# Patient Record
Sex: FEMALE | Race: WHITE | Employment: UNEMPLOYED | URBAN - METROPOLITAN AREA
[De-identification: names, ages, dates, MRNs, and addresses within clinical notes are randomized per-mention and may not be internally consistent; named-entity substitution may affect disease eponyms.]

---

## 2018-09-17 ENCOUNTER — HOSPITAL ENCOUNTER (OUTPATIENT)
Dept: ULTRASOUND IMAGING | Age: 33
Discharge: HOME | End: 2018-09-17
Attending: OBSTETRICS & GYNECOLOGY
Payer: COMMERCIAL

## 2018-09-17 DIAGNOSIS — R20.2 NUMBNESS AND TINGLING OF LEFT LEG: ICD-10-CM

## 2018-09-17 DIAGNOSIS — R20.0 NUMBNESS AND TINGLING OF LEFT LEG: ICD-10-CM

## 2018-09-17 DIAGNOSIS — M79.605 PAIN OF LEFT LOWER EXTREMITY: ICD-10-CM

## 2018-09-17 PROCEDURE — 93971 EXTREMITY STUDY: CPT

## 2018-09-29 PROBLEM — Z34.80 PRENATAL CARE OF MULTIGRAVIDA, ANTEPARTUM: Status: ACTIVE | Noted: 2018-09-29

## 2019-01-04 ENCOUNTER — HOSPITAL ENCOUNTER (OUTPATIENT)
Age: 34
Discharge: HOME | End: 2019-01-04
Attending: OBSTETRICS & GYNECOLOGY | Admitting: OBSTETRICS & GYNECOLOGY
Payer: COMMERCIAL

## 2019-01-04 VITALS
TEMPERATURE: 98.7 F | DIASTOLIC BLOOD PRESSURE: 74 MMHG | BODY MASS INDEX: 21.74 KG/M2 | RESPIRATION RATE: 16 BRPM | SYSTOLIC BLOOD PRESSURE: 107 MMHG | HEIGHT: 68 IN | HEART RATE: 94 BPM

## 2019-01-04 PROBLEM — R10.9 ABDOMINAL PAIN DURING PREGNANCY IN SECOND TRIMESTER: Status: ACTIVE | Noted: 2019-01-04

## 2019-01-04 PROBLEM — O26.892 ABDOMINAL PAIN DURING PREGNANCY IN SECOND TRIMESTER: Status: ACTIVE | Noted: 2019-01-04

## 2019-01-04 PROCEDURE — 76817 TRANSVAGINAL US OBSTETRIC: CPT

## 2019-01-04 PROCEDURE — 59025 FETAL NON-STRESS TEST: CPT

## 2019-01-04 PROCEDURE — 99283 EMERGENCY DEPT VISIT LOW MDM: CPT

## 2019-01-04 NOTE — PROGRESS NOTES
Pt here in triage with complaints of possible contractions. Pt states she was having severe pain last night; pt rates pain 7/10 and stated it started in lower belly and wrapped around to lower back. Pt reports +FM; no LOF and no vaginal bleeding.  Will notify MD of pt arrival.

## 2019-01-04 NOTE — ED PROVIDER NOTES
Chief Complaint: pelvic pain 35 y.o. female   at 22w6d 
weeks gestation who is seen for moderate abdominal pain. Pt reports episode yesterday of crampy pelvic pain that radiated to back. It resolved but returned stronger last night (pain a 7/10.) that also subsided spontaneously, but returned today- milder. Pt is concerned that she could be having  contractions. She has had 2 term deliveries but had  contractions with one of her previous pregnancies. No vag bleeding or discharge. No hx uti's. Some constipation with this pregnancy. HISTORY: 
 
Social History Substance and Sexual Activity Sexual Activity Yes  Partners: Male  Birth control/protection: None Comment: pregnant Patient's last menstrual period was 2018 (approximate). Social History Socioeconomic History  Marital status:  Spouse name: Janelle Villanueva Number of children: 1  Years of education: 15  
 Highest education level: Not on file Social Needs  Financial resource strain: Not on file  Food insecurity - worry: Not on file  Food insecurity - inability: Not on file  Transportation needs - medical: Not on file  Transportation needs - non-medical: Not on file Occupational History  Not on file Tobacco Use  Smoking status: Never Smoker  Smokeless tobacco: Never Used Substance and Sexual Activity  Alcohol use: No  
 Drug use: No  
 Sexual activity: Yes  
  Partners: Male Birth control/protection: None Comment: pregnant Other Topics Concern [address] Service Not Asked  Blood Transfusions Not Asked  Caffeine Concern Not Asked  Occupational Exposure Not Asked [employer] Hazards Not Asked  Sleep Concern Not Asked  Stress Concern Not Asked  Weight Concern Not Asked  Special Diet Not Asked  Back Care Not Asked  Exercise Not Asked  Bike Helmet Not Asked  Seat Belt Not Asked  Self-Exams Not Asked Social History Narrative  Not on file Past Surgical History:  
Procedure Laterality Date  HX OTHER SURGICAL    
 bone graft on upper jaw   HX TONSILLECTOMY Past Medical History:  
Diagnosis Date  Asthma   
 History of chicken pox ROS: 
A 12 point review of symptoms negative except for chief complaint as described above. PHYSICAL EXAM: 
Blood pressure 107/74, pulse 94, temperature 98.7 °F (37.1 °C), resp. rate 16, height 5' 8\" (1.727 m), last menstrual period 2018, currently breastfeeding. Constitutional: The patient appears well, alert, oriented x 3. Cardiovascular: Heart RRR, no murmurs. Respiratory: Lungs clear, no respiratory distress GI: Abdomen soft, nontender, no guarding No fundal tenderness Musculoskeletal: no cva tenderness Upper ext: no edema, reflexes +2 Lower ext: no edema, neg juan's, reflexes +2 Skin: no rashes or lesions Psychiatric:Mood/ Affect: appropriate Genitourinary: SVE: cl/th FHT: + appropriate gest age TOCO:no contractions on monitor 
transvag ultrasound- cervical length 4.5 cm, no funneling 
ua neg except trace leukocytes I personally reviewed pt's medical record including relevant labs and ultrasounds Assessment/Plan: 
36 yo  at 22w6d with pelvic pain that is intermittent and crampy No evidence  labor; possible pain from GI, encouraged hydration, magnesium Keep f/u as scheduled

## 2019-01-04 NOTE — DISCHARGE INSTRUCTIONS
Patient Education        Pregnancy Precautions: Care Instructions  Your Care Instructions    There is no sure way to prevent labor before your due date ( labor) or to prevent most other pregnancy problems. But there are things you can do to increase your chances of a healthy pregnancy. Go to your appointments, follow your doctor's advice, and take good care of yourself. Eat well, and exercise (if your doctor agrees). And make sure to drink plenty of water. Follow-up care is a key part of your treatment and safety. Be sure to make and go to all appointments, and call your doctor if you are having problems. It's also a good idea to know your test results and keep a list of the medicines you take. How can you care for yourself at home? · Make sure you go to your prenatal appointments. At each visit, your doctor will check your blood pressure. Your doctor will also check to see if you have protein in your urine. High blood pressure and protein in urine are signs of preeclampsia. This condition can be dangerous for you and your baby. · Drink plenty of fluids, enough so that your urine is light yellow or clear like water. Dehydration can cause contractions. If you have kidney, heart, or liver disease and have to limit fluids, talk with your doctor before you increase the amount of fluids you drink. · Tell your doctor right away if you notice any symptoms of an infection, such as:  ? Burning when you urinate. ? A foul-smelling discharge from your vagina. ? Vaginal itching. ? Unexplained fever. ? Unusual pain or soreness in your uterus or lower belly. · Eat a balanced diet. Include plenty of foods that are high in calcium and iron. ? Foods high in calcium include milk, cheese, yogurt, almonds, and broccoli. ? Foods high in iron include red meat, shellfish, poultry, eggs, beans, raisins, whole-grain bread, and leafy green vegetables. · Do not smoke.  If you need help quitting, talk to your doctor about stop-smoking programs and medicines. These can increase your chances of quitting for good. · Do not drink alcohol or use illegal drugs. · Follow your doctor's directions about activity. Your doctor will let you know how much, if any, exercise you can do. · Ask your doctor if you can have sex. If you are at risk for early labor, your doctor may ask you to not have sex. · Take care to prevent falls. During pregnancy, your joints are loose, and your balance is off. Sports such as bicycling, skiing, or in-line skating can increase your risk of falling. And don't ride horses or motorcycles, dive, water ski, scuba dive, or parachute jump while you are pregnant. · Avoid getting very hot. Do not use saunas or hot tubs. Avoid staying out in the sun in hot weather for long periods. Take acetaminophen (Tylenol) to lower a high fever. · Do not take any over-the-counter or herbal medicines or supplements without talking to your doctor or pharmacist first.  When should you call for help? Call 911 anytime you think you may need emergency care. For example, call if:    · You passed out (lost consciousness).     · You have severe vaginal bleeding.     · You have severe pain in your belly or pelvis.     · You have had fluid gushing or leaking from your vagina and you know or think the umbilical cord is bulging into your vagina. If this happens, immediately get down on your knees so your rear end (buttocks) is higher than your head. This will decrease the pressure on the cord until help arrives.   [facility] your doctor now or seek immediate medical care if:    · You have signs of preeclampsia, such as:  ? Sudden swelling of your face, hands, or feet. ? New vision problems (such as dimness or blurring). ? A severe headache.     · You have any vaginal bleeding.     · You have belly pain or cramping.     · You have a fever.     · You have had regular contractions (with or without pain) for an hour.  This means that you have 8 or more within 1 hour or 4 or more in 20 minutes after you change your position and drink fluids.     · You have a sudden release of fluid from your vagina.     · You have low back pain or pelvic pressure that does not go away.     · You notice that your baby has stopped moving or is moving much less than normal.    Watch closely for changes in your health, and be sure to contact your doctor if you have any problems. Where can you learn more? Go to http://genaro-maria.info/. Enter [id number] in the search box to learn more about \"Pregnancy Precautions: Care Instructions. \"  Current as of: November 21, 2017  Content Version: 11.8  © 9163-4021 [employer], [employer]. Care instructions adapted under license by [employer] (which disclaims liability or warranty for this information). If you have questions about a medical condition or this instruction, always ask your healthcare professional. [address] any warranty or liability for your use of this information.

## 2019-01-23 ENCOUNTER — HOSPITAL ENCOUNTER (OUTPATIENT)
Dept: PHYSICAL THERAPY | Age: 34
Discharge: HOME | End: 2019-01-23
Payer: COMMERCIAL

## 2019-01-23 PROCEDURE — 97110 THERAPEUTIC EXERCISES: CPT

## 2019-01-23 PROCEDURE — 97162 PT EVAL MOD COMPLEX 30 MIN: CPT

## 2019-01-23 NOTE — THERAPY EVALUATION
Abimael Aguayo  : 1985  Primary: Poli Reyesgoran Kina  Secondary:  [address]  at [place] [address] [facility], [address], [address] [facility]  [phone]   [phone]           OUTPATIENT PHYSICAL THERAPY:Initial Assessment 2019   ICD-10: Treatment Diagnosis: Low back pain (M54.5); Pain in right hip (M25.551); Pain in left hip (M25.552)  Precautions/Allergies:   Sulfa (sulfonamide antibiotics)   MD Orders: Evaluate and Treat MEDICAL/REFERRING DIAGNOSIS:  Pregnancy related hip pain, antepartum [O26.899, M25.559]   DATE OF ONSET: 2019  REFERRING PHYSICIAN: Sy Donnelly MD  RETURN PHYSICIAN APPOINTMENT: 19     INITIAL ASSESSMENT:  Ms. Juliet Amador presents with decreased lumbar ROM, LE strength/flexibility and increased pain leading to decreased functional status. Pt would benefit from skilled physical therapy services to address the above deficits and help patient return to prior level of function. PROBLEM LIST (Impacting functional limitations):  1. Decreased Strength  2. Decreased ADL/Functional Activities  3. Increased Pain  4. Decreased Flexibility/Joint Mobility  5. Decreased [place] with Home Exercise Program INTERVENTIONS PLANNED: (Treatment may consist of any combination of the following)  1. Cold  2. Cryotherapy  3. Electrical Stimulation  4. Heat  5. Home Exercise Program (HEP)  6. Manual Therapy  7. Range of Motion (ROM)  8. Therapeutic Exercise/Strengthening   TREATMENT PLAN:  Effective Dates: 2019 TO 3/24/2019 (60 days). Frequency/Duration: 2 times a week for 60 Day(s)  GOALS: (Goals have been discussed and agreed upon with patient.)  Short-Term Functional Goals: Time Frame: 4 weeks  1. Pt will increase SLR 50 degrees bilaterally to decrease pain and assist with sleeping  2. Pt will increase strength bilateral hips 4+/5 to decrease pain and assist with sleeping  3.  Pt will be independent with HEP  Discharge Goals: Time Frame: 8 weeks  1. Pt will increase SLR 60 degrees bilaterally to decrease pain and assist with sleeping  2. Pt will increase strength bilateral hips 5/5 to decrease pain and assist with sleeping  3. Pt will sleep 8 hours without awakening due to hip pain  Rehabilitation Potential For Stated Goals: Good  Regarding Larissa Gaines's therapy, I certify that the treatment plan above will be carried out by a therapist or under their direction. Thank you for this referral,  Padmini Verduzco, PT     Referring Physician Signature: Netta St MD              Date                    The information in this section was collected on 01-23-19 (except where otherwise noted). HISTORY:   History of Present Injury/Illness (Reason for Referral):  Pt reports insidious onset bilateral hip pain of a few weeks duration. She has 2 children and is 26 weeks pregnant with her 3rd child. Pt states her hips felt loose after her second pregnancy and she did some strengthening and the pain improved until she got pregnant again. Pt states her pain is in the lateral hips \"in the joint. \"  Pt sleeps on her left side and occasionally on her right. She rates her current pain 0/10 sitting at rest, increasing to 8/10 at times. Pt states the pain is worst at night when sleeping. She sleeps max of 6 hours a night. She states she wakes up constantly throughout the night due to her pain. Pt is taking Tylenol prn for her hip pain. She reports some occasional pain in her low back. She states when she gets the pain that it sometimes radiates into her thighs. She denies any LE numbness or tingling. Pt is a stay at home mom. Pt reports popping in her hips at times. She states she feels like her hips are going to fall out of joint. Pt would like to return to her gym to workout once her hips get a little stronger.   Past Medical History/Comorbidities:   Ms. Travis Leigh  has a past medical history of Asthma and History of chicken pox. She also has no past medical history of Abnormal Papanicolaou smear of cervix, Anemia, Chlamydia, Complication of anesthesia, Diabetes ([facility] Disease of blood and blood forming organ, DVT (deep venous thrombosis) ([facility] Eclampsia, Ectopic pregnancy, Essential hypertension, Genital herpes, Gestational diabetes, Gestational hypertension, Gonorrhea, Heart abnormality, Herpes gestationis, Herpes simplex virus (HSV) infection, Human immunodeficiency virus (HIV) disease ([facility] Hypertension, Hyperthyroidism, Hypothyroidism, Infertility, female, Kidney disease, Liver disease, Molar pregnancy, Nicotine vapor product user, Non-nicotine vapor product user, Phlebitis and thrombophlebitis of unspecified site, Pituitary disorder ([facility] Polycystic disease, ovaries, Postpartum depression, Preeclampsia,  delivery, Psychiatric problem, Rh negative state in antepartum period, Rhesus isoimmunization affecting pregnancy, Sickle cell trait syndrome ([facility] Sickle-cell disease, unspecified, Syphilis, Systemic lupus erythematosus ([facility] Thyroid activity decreased, Trauma, Unspecified breast disorder, Unspecified epilepsy without mention of intractable epilepsy, or  (vaginal birth after ). Ms. Digna Rand  has a past surgical history that includes hx tonsillectomy and hx other surgical.  Social History/Living Environment:     Pt lives with spouse and 2 children in a one story house with a few steps to enter  Prior Level of Function/Work/Activity:  Pt is a stay at home mom  Dominant Side:         RIGHT  Other Clinical Tests:          None  Personal Factors:          Sex:  female        Age:  35 y.o.         Profession:  Pt is a stay at home mom     Ambulatory/Rehab Services H2 Model Falls Risk Assessment    Risk Factors:       No Risk Factors Identified Ability to Rise from Chair:       (0)  Ability to rise in a single movement    Falls Prevention Plan:       No modifications necessary   Total: (5 or greater = High Risk): 0    ©2010 [facility] [address] [place] [address] States Patent #[id number]. Federal Law prohibits the replication, distribution or use without written permission from [facility] [employer]     Current Medications:       Current Outpatient Medications:     Cetirizine (ZYRTEC) 10 mg cap, Take  by mouth., Disp: , Rfl:     PROGESTERONE MICRONIZED PO, Take 25 mg by mouth., Disp: , Rfl:     PNV53-iron-FA-docusate Ca-dha (NEXA PLUS) 29 mg iron-1.25 mg-55 mg cap, Take  by mouth., Disp: , Rfl:    Date Last Reviewed:  01-23-19   Number of Personal Factors/Comorbidities that affect the Plan of Care: 1-2: MODERATE COMPLEXITY   EXAMINATION:   Observation/Orthostatic Postural Assessment:          Increased lumbar lordosis  Palpation:          No tenderness to palpation in low back or LE's  ROM:    Lumbar flexion = 50 degrees  Lumbar extension = 20 degrees  Lumbar side bending R = 30 degrees  Lumbar side bending L = 30 degrees    Strength:    R hip flexion = 4/5  R hip abduction = 4/5  R hip adduction = 4+/5  R knee extension = 4+/5  R knee flexion = 4+/5  R ankle dorsiflexion = 5/5  R ankle plantarflexion = 5/5    L hip flexion = 4/5  L hip abduction = 4/5  L hip adduction = 4+/5  L knee extension = 4+/5  L knee flexion = 4+/5  L ankle dorsiflexion = 5/5  L ankle plantarflexion = 5/5    Special Tests:          SLR 40 degrees with marked hamstring tightness noted bilaterally  Neurological Screen:        Myotomes:  Weakness throughout bilateral hips        Dermatomes:  Sensation intact to light touch bilateral LE's        Reflexes:  DTR's 2+ to bilateral achilles and patellar  Functional Mobility:         Gait/Ambulation:  No deficits noted        Transfers:  Pt able to transition sit to supine and supine to sit independently    Body Structures Involved:  1. Muscles Body Functions Affected:  1. Sensory/Pain  2. Neuromusculoskeletal Activities and Participation Affected:  1. Mobility  2.  Domestic Life   Number of elements (examined above) that affect the Plan of Care: 3: MODERATE COMPLEXITY   CLINICAL PRESENTATION:   Presentation: Evolving clinical presentation with changing clinical characteristics: MODERATE COMPLEXITY   CLINICAL DECISION MAKING:   Outcome Measure: Tool Used: Lower Extremity Functional Scale (LEFS)  Score:  Initial: 60/80 Most Recent: X/80 (Date: -- )   Interpretation of Score: 20 questions each scored on a 5 point scale with 0 representing \"extreme difficulty or unable to perform\" and 4 representing \"no difficulty\". The lower the score, the greater the functional disability. 80/80 represents no disability. Minimal detectable change is 9 points. Medical Necessity:   · Patient is expected to demonstrate progress in strength, range of motion and functional technique to increase independence with sleeping. · Patient demonstrates good rehab potential due to higher previous functional level. Reason for Services/Other Comments:  · Patient continues to require skilled intervention due to decreased lumbar ROM, LE strength/flexibility and increased pain leading to decreased functional status. Use of outcome tool(s) and clinical judgement create a POC that gives a: Questionable prediction of patient's progress: MODERATE COMPLEXITY            TREATMENT:   (In addition to Assessment/Re-Assessment sessions the following treatments were rendered)  Pre-treatment Symptoms/Complaints:  Bilateral hip pain  Pain: Initial:     0/10 sitting at rest Post Session:  0/10 sitting at rest     THERAPEUTIC EXERCISE: (15 minutes):  Exercises per grid below to improve mobility and strength. Required minimal verbal cues to promote proper body alignment and promote proper body posture. Progressed resistance and repetitions as indicated.    Date:  01-23-19 Date:   Date:     Activity/Exercise Parameters Parameters Parameters   Active Hamstring Stretch 3 reps  20 sec holds     Hip Abduction with T-band Green  20 reps     Hip Adduction with Ball 20 reps  5 sec holds     Pelvic Tilts 15 reps  5 sec holds     SLR Flexion 10 reps  5 sec holds                     [facility] Portal  Treatment/Session Assessment:    · Response to Treatment:  Pt tolerated all treatments well with no c/o. Pt may come just one more visit to progress HEP, then transition to gym if she is improving. · Compliance with Program/Exercises: Will assess as treatment progresses. · Recommendations/Intent for next treatment session: \"Next visit will focus on advancements to more challenging activities\".   Total Treatment Duration:  15 minutes  PT Patient Time In/Time Out  Time In: 7704  Time Out: [address], PT    Future Appointments   Date Time Provider Andrea Trevino   1/30/2019  9:45 AM Elton Nickerson PT Located within [facility]   2/6/2019  8:30 AM Janae Stanley MD [place] [facility]   2/6/2019  8:40 AM [address]

## 2019-01-30 ENCOUNTER — HOSPITAL ENCOUNTER (OUTPATIENT)
Dept: PHYSICAL THERAPY | Age: 34
Discharge: HOME | End: 2019-01-30
Payer: COMMERCIAL

## 2019-01-30 PROCEDURE — 97110 THERAPEUTIC EXERCISES: CPT

## 2019-01-30 NOTE — PROGRESS NOTES
Edwina Plaster  : 1985  Primary: Shae Whyte Kina  Secondary:  [address] at [address] [facility] [address], [address] [place] [address], [address] [facility]  [phone]   [phone]           OUTPATIENT PHYSICAL THERAPY:Daily Note and Discharge Summary 2019   ICD-10: Treatment Diagnosis: Low back pain (M54.5); Pain in right hip (M25.551); Pain in left hip (M25.552)  Precautions/Allergies:   Sulfa (sulfonamide antibiotics)   MD Orders: Evaluate and Treat MEDICAL/REFERRING DIAGNOSIS:  Pregnancy related hip pain, antepartum [O26.899, M25.559]   DATE OF ONSET: 2019  REFERRING PHYSICIAN: Mari Tran MD  RETURN PHYSICIAN APPOINTMENT: 19     INITIAL ASSESSMENT:  Ms. Brittany Mckeon presents with decreased lumbar ROM, LE strength/flexibility and increased pain leading to decreased functional status. Pt would benefit from skilled physical therapy services to address the above deficits and help patient return to prior level of function. PROBLEM LIST (Impacting functional limitations):  1. Decreased Strength  2. Decreased ADL/Functional Activities  3. Increased Pain  4. Decreased Flexibility/Joint Mobility  5. Decreased [place] with Home Exercise Program INTERVENTIONS PLANNED: (Treatment may consist of any combination of the following)  1. Cold  2. Cryotherapy  3. Electrical Stimulation  4. Heat  5. Home Exercise Program (HEP)  6. Manual Therapy  7. Range of Motion (ROM)  8. Therapeutic Exercise/Strengthening   TREATMENT PLAN:  Effective Dates: 2019 TO 3/24/2019 (60 days). Frequency/Duration: 2 times a week for 60 Day(s)  GOALS: (Goals have been discussed and agreed upon with patient.)  Short-Term Functional Goals: Time Frame: 4 weeks  1. Pt will increase SLR 50 degrees bilaterally to decrease pain and assist with sleeping  2. Pt will increase strength bilateral hips 4+/5 to decrease pain and assist with sleeping  3.  Pt will be independent with HEP  Discharge Goals: Time Frame: 8 weeks  1. Pt will increase SLR 60 degrees bilaterally to decrease pain and assist with sleeping  2. Pt will increase strength bilateral hips 5/5 to decrease pain and assist with sleeping  3. Pt will sleep 8 hours without awakening due to hip pain  Rehabilitation Potential For Stated Goals: Good  Regarding Neida Gaines's therapy, I certify that the treatment plan above will be carried out by a therapist or under their direction. Thank you for this referral,  Marquita Perry PT     Referring Physician Signature: Ivy Coleman MD              Date                    The information in this section was collected on 01-23-19 (except where otherwise noted). HISTORY:   History of Present Injury/Illness (Reason for Referral):  Pt reports insidious onset bilateral hip pain of a few weeks duration. She has 2 children and is 26 weeks pregnant with her 3rd child. Pt states her hips felt loose after her second pregnancy and she did some strengthening and the pain improved until she got pregnant again. Pt states her pain is in the lateral hips \"in the joint. \"  Pt sleeps on her left side and occasionally on her right. She rates her current pain 0/10 sitting at rest, increasing to 8/10 at times. Pt states the pain is worst at night when sleeping. She sleeps max of 6 hours a night. She states she wakes up constantly throughout the night due to her pain. Pt is taking Tylenol prn for her hip pain. She reports some occasional pain in her low back. She states when she gets the pain that it sometimes radiates into her thighs. She denies any LE numbness or tingling. Pt is a stay at home mom. Pt reports popping in her hips at times. She states she feels like her hips are going to fall out of joint. Pt would like to return to her gym to workout once her hips get a little stronger.   Past Medical History/Comorbidities:   Ms. Kel Benjamin  has a past medical history of Asthma and History of chicken pox. She also has no past medical history of Abnormal Papanicolaou smear of cervix, Anemia, Chlamydia, Complication of anesthesia, Diabetes ([facility] [address] Disease of blood and blood forming organ, DVT (deep venous thrombosis) ([facility] [address] Eclampsia, Ectopic pregnancy, Essential hypertension, Genital herpes, Gestational diabetes, Gestational hypertension, Gonorrhea, Heart abnormality, Herpes gestationis, Herpes simplex virus (HSV) infection, Human immunodeficiency virus (HIV) disease ([facility] [address] Hypertension, Hyperthyroidism, Hypothyroidism, Infertility, female, Kidney disease, Liver disease, Molar pregnancy, Nicotine vapor product user, Non-nicotine vapor product user, Phlebitis and thrombophlebitis of unspecified site, Pituitary disorder ([facility] [address] Polycystic disease, ovaries, Postpartum depression, Preeclampsia,  delivery, Psychiatric problem, Rh negative state in antepartum period, Rhesus isoimmunization affecting pregnancy, Sickle cell trait syndrome ([facility] [address] Sickle-cell disease, unspecified, Syphilis, Systemic lupus erythematosus ([facility] [address] Thyroid activity decreased, Trauma, Unspecified breast disorder, Unspecified epilepsy without mention of intractable epilepsy, or  (vaginal birth after ). Ms. Geremias Nicolas  has a past surgical history that includes hx tonsillectomy and hx other surgical.  Social History/Living Environment:     Pt lives with spouse and 2 children in a one story house with a few steps to enter  Prior Level of Function/Work/Activity:  Pt is a stay at home mom  Dominant Side:         RIGHT  Other Clinical Tests:          None  Personal Factors:          Sex:  female        Age:  35 y.o.         Profession:  Pt is a stay at home mom     Ambulatory/Rehab Services H2 Model Falls Risk Assessment    Risk Factors:       No Risk Factors Identified Ability to Rise from Chair:       (0)  Ability to rise in a single movement    Falls Prevention Plan:       No modifications necessary   Total: (5 or greater = High Risk): 0    ©2010 [employer] [facility] [employer] [facility] [place]. All Rights Reserved. [facility] States Patent #[id number]. Federal Law prohibits the replication, distribution or use without written permission from [employer] [facility]     Current Medications:       Current Outpatient Medications:     Cetirizine (ZYRTEC) 10 mg cap, Take  by mouth., Disp: , Rfl:     PROGESTERONE MICRONIZED PO, Take 25 mg by mouth., Disp: , Rfl:     PNV53-iron-FA-docusate Ca-dha (NEXA PLUS) 29 mg iron-1.25 mg-55 mg cap, Take  by mouth., Disp: , Rfl:    Date Last Reviewed:  01-23-19   Number of Personal Factors/Comorbidities that affect the Plan of Care: 1-2: MODERATE COMPLEXITY   EXAMINATION:   Observation/Orthostatic Postural Assessment:          Increased lumbar lordosis  Palpation:          No tenderness to palpation in low back or LE's  ROM:    Lumbar flexion = 50 degrees  Lumbar extension = 20 degrees  Lumbar side bending R = 30 degrees  Lumbar side bending L = 30 degrees    Strength:    R hip flexion = 4/5  R hip abduction = 4/5  R hip adduction = 4+/5  R knee extension = 4+/5  R knee flexion = 4+/5  R ankle dorsiflexion = 5/5  R ankle plantarflexion = 5/5    L hip flexion = 4/5  L hip abduction = 4/5  L hip adduction = 4+/5  L knee extension = 4+/5  L knee flexion = 4+/5  L ankle dorsiflexion = 5/5  L ankle plantarflexion = 5/5    Special Tests:          SLR 40 degrees with marked hamstring tightness noted bilaterally  Neurological Screen:        Myotomes:  Weakness throughout bilateral hips        Dermatomes:  Sensation intact to light touch bilateral LE's        Reflexes:  DTR's 2+ to bilateral achilles and patellar  Functional Mobility:         Gait/Ambulation:  No deficits noted        Transfers:  Pt able to transition sit to supine and supine to sit independently    Body Structures Involved:  1. Muscles Body Functions Affected:  1. Sensory/Pain  2. Neuromusculoskeletal Activities and Participation Affected:  1. Mobility  2.  Domestic Life Number of elements (examined above) that affect the Plan of Care: 3: MODERATE COMPLEXITY   CLINICAL PRESENTATION:   Presentation: Evolving clinical presentation with changing clinical characteristics: MODERATE COMPLEXITY   CLINICAL DECISION MAKING:   Outcome Measure: Tool Used: Lower Extremity Functional Scale (LEFS)  Score:  Initial: 60/80 Most Recent: X/80 (Date: -- )   Interpretation of Score: 20 questions each scored on a 5 point scale with 0 representing \"extreme difficulty or unable to perform\" and 4 representing \"no difficulty\". The lower the score, the greater the functional disability. 80/80 represents no disability. Minimal detectable change is 9 points. Medical Necessity:   · Patient is expected to demonstrate progress in strength, range of motion and functional technique to increase independence with sleeping. · Patient demonstrates good rehab potential due to higher previous functional level. Reason for Services/Other Comments:  · Patient continues to require skilled intervention due to decreased lumbar ROM, LE strength/flexibility and increased pain leading to decreased functional status. Use of outcome tool(s) and clinical judgement create a POC that gives a: Questionable prediction of patient's progress: MODERATE COMPLEXITY            TREATMENT:   (In addition to Assessment/Re-Assessment sessions the following treatments were rendered)  Pre-treatment Symptoms/Complaints:  Pt reports significant improvement in her pain since her last session. Pain: Initial:     0/10 sitting at rest Post Session:  0/10 sitting at rest     THERAPEUTIC EXERCISE: (40 minutes):  Exercises per grid below to improve mobility and strength. Required minimal verbal cues to promote proper body alignment and promote proper body posture. Progressed resistance and repetitions as indicated.    Date:  01-23-19 Date:  01-30-19 Date:     Activity/Exercise Parameters Parameters Parameters   Active Hamstring Stretch 3 reps  20 sec holds 3 reps  20 sec holds    Hip Abduction with T-band Green  20 reps Blue  20 reps    Hip Adduction with Ball 20 reps  5 sec holds 20 reps  5 sec holds    Pelvic Tilts 15 reps  5 sec holds     SLR Flexion 10 reps  5 sec holds 10 reps  5 sec holds    SKTC  3 reps  15 sec holds    Piriformis Stretch  3 reps  15 sec holds    Gluteal Sets  15 reps  5 sec holds    Clam Shells  20 reps each    Isometric Unilateral Hip Flexion  5 reps  10 sec holds        MedBridge Portal  Treatment/Session Assessment:    · Response to Treatment:  Pt tolerated all treatments well with no c/o. Pt to continue HEP. Discharge from PT to independent HEP. · Compliance with Program/Exercises: Will assess as treatment progresses. · Recommendations/Intent for next treatment session: \"Next visit will focus on advancements to more challenging activities\".   Total Treatment Duration:  15 minutes  PT Patient Time In/Time Out  Time In: 1489  Time Out: [address], PT    Future Appointments   Date Time Provider Andrea Trevino   2/6/2019  8:30 AM Felipe Ellsworth MD [facility]   2/6/2019  8:40 AM [address]

## 2019-02-06 ENCOUNTER — HOSPITAL ENCOUNTER (OUTPATIENT)
Age: 34
Discharge: HOME | End: 2019-02-06
Attending: OBSTETRICS & GYNECOLOGY | Admitting: OBSTETRICS & GYNECOLOGY
Payer: COMMERCIAL

## 2019-02-06 VITALS
HEIGHT: 68 IN | BODY MASS INDEX: 23.72 KG/M2 | RESPIRATION RATE: 18 BRPM | DIASTOLIC BLOOD PRESSURE: 81 MMHG | SYSTOLIC BLOOD PRESSURE: 117 MMHG | HEART RATE: 94 BPM | TEMPERATURE: 98 F

## 2019-02-06 PROBLEM — O26.893 ABDOMINAL PAIN DURING PREGNANCY, THIRD TRIMESTER: Status: ACTIVE | Noted: 2019-02-06

## 2019-02-06 PROBLEM — R10.9 ABDOMINAL PAIN DURING PREGNANCY, THIRD TRIMESTER: Status: ACTIVE | Noted: 2019-02-06

## 2019-02-06 PROCEDURE — 74011250637 HC RX REV CODE- 250/637: Performed by: OBSTETRICS & GYNECOLOGY

## 2019-02-06 PROCEDURE — 99283 EMERGENCY DEPT VISIT LOW MDM: CPT

## 2019-02-06 RX ADMIN — SODIUM PHOSPHATE 1 ENEMA: 7; 19 ENEMA RECTAL at 16:39

## 2019-02-06 NOTE — ED PROVIDER NOTES
Chief Complaint:      35 y.o. female at 27w4d  weeks gestation who is seen for moderate abdominal pain. Unable to have a bowel movement, last one was 2 d ago. Crampy pain began after noon, sat on commode 45 min w no results. Tried warm prune juice with no help. Prenatal care uncomplicated thus far. HISTORY:    Social History     Substance and Sexual Activity   Sexual Activity Yes    Partners: Male    Birth control/protection: None    Comment: pregnant     Patient's last menstrual period was 07/19/2018 (approximate).     Social History     Socioeconomic History    Marital status:      Spouse name: January De Leon Number of children: 1    Years of education: 15    Highest education level: Not on file   Social Needs    Financial resource strain: Not on file    Food insecurity - worry: Not on file    Food insecurity - inability: Not on file   [employer] needs - medical: Not on file   [employer] needs - non-medical: Not on file   Occupational History    Not on file   Tobacco Use    Smoking status: Never Smoker    Smokeless tobacco: Never Used   Substance and Sexual Activity    Alcohol use: No    Drug use: No    Sexual activity: Yes     Partners: Male     Birth control/protection: None     Comment: pregnant   Other Topics Concern     Service Not Asked    Blood Transfusions Not Asked    Caffeine Concern Not Asked    Occupational Exposure Not Asked    Hobby Hazards Not Asked    Sleep Concern Not Asked    Stress Concern Not Asked    Weight Concern Not Asked    Special Diet Not Asked    Back Care Not Asked    Exercise Not Asked    Bike Helmet Not Asked   [address] Not Asked    Self-Exams Not Asked   Social History Narrative    Not on file       Past Surgical History:   Procedure Laterality Date    HX OTHER SURGICAL      bone graft on upper jaw 2004    HX TONSILLECTOMY         Past Medical History:   Diagnosis Date    Asthma     History of chicken pox ROS:  A 12 point review of symptoms negative except for chief complaint as described above. PHYSICAL EXAM:  Blood pressure 117/81, pulse 94, temperature 98 °F (36.7 °C), resp. rate 18, height 5' 8\" (1.727 m), last menstrual period 07/19/2018, currently breastfeeding. Constitutional: The patient appears well, alert, oriented x 3. Cardiovascular: Heart RRR, no murmurs. Respiratory: Lungs clear, no respiratory distress  GI: Abdomen soft, nontender, no guarding  No fundal tenderness  Musculoskeletal: no cva tenderness  Upper ext: no edema, reflexes +2  Lower ext: no edema, neg juan's, reflexes +2  Skin: no rashes or lesions  Psychiatric:Mood/ Affect: appropriate  Genitourinary: SVE: cl/th post.  Rectal vault full  FHT: 130's moderate variability  TOCO: no contractions    I personally reviewed pt's medical record including relevant labs and ultrasounds    Assessment/Plan: Abdominal pain due to constipation. Given Fleets enema, good results. Reassuring fetal status, no evidence PTL. Stable for home, follow up in office as scheduled.

## 2019-02-06 NOTE — DISCHARGE INSTRUCTIONS
Patient Education        Constipation: Care Instructions  Your Care Instructions    Constipation means that you have a hard time passing stools (bowel movements). People pass stools from 3 times a day to once every 3 days. What is normal for you may be different. Constipation may occur with pain in the rectum and cramping. The pain may get worse when you try to pass stools. Sometimes there are small amounts of bright red blood on toilet paper or the surface of stools. This is because of enlarged veins near the rectum (hemorrhoids). A few changes in your diet and lifestyle may help you avoid ongoing constipation. Your doctor may also prescribe medicine to help loosen your stool. Some medicines can cause constipation. These include pain medicines and antidepressants. Tell your doctor about all the medicines you take. Your doctor may want to make a medicine change to ease your symptoms. Follow-up care is a key part of your treatment and safety. Be sure to make and go to all appointments, and call your doctor if you are having problems. It's also a good idea to know your test results and keep a list of the medicines you take. How can you care for yourself at home? · Drink plenty of fluids, enough so that your urine is light yellow or clear like water. If you have kidney, heart, or liver disease and have to limit fluids, talk with your doctor before you increase the amount of fluids you drink. · Include high-fiber foods in your diet each day. These include fruits, vegetables, beans, and whole grains. · Get at least 30 minutes of exercise on most days of the week. Walking is a good choice. You also may want to do other activities, such as running, swimming, cycling, or playing tennis or team sports. · Take a fiber supplement, such as Citrucel or Metamucil, every day. Read and follow all instructions on the label. · Schedule time each day for a bowel movement. A daily routine may help.  Take your time having your bowel movement. · Support your feet with a small step stool when you sit on the toilet. This helps flex your hips and places your pelvis in a squatting position. · Your doctor may recommend an over-the-counter laxative to relieve your constipation. Examples are Milk of Magnesia and MiraLax. Read and follow all instructions on the label. Do not use laxatives on a long-term basis. When should you call for help? Call your doctor now or seek immediate medical care if:    · You have new or worse belly pain.     · You have new or worse nausea or vomiting.     · You have blood in your stools.    Watch closely for changes in your health, and be sure to contact your doctor if:    · Your constipation is getting worse.     · You do not get better as expected. Where can you learn more? Go to http://genaro-maria.info/. Enter 21 [phone] in the search box to learn more about \"Constipation: Care Instructions. \"  Current as of: September 23, 2018  Content Version: 11.9  © 4940-5664 [employer]. Care instructions adapted under license by "[employer] (which disclaims liability or warranty for this information). If you have questions about a medical condition or this instruction, always ask your healthcare professional. [place] [address] any warranty or liability for your use of this information.

## 2019-02-06 NOTE — PROGRESS NOTES
16:39 Medication Given sodium phosphate (FLEET'S) enema 1 Enema -  Dose: 1 Enema ; Route: Rectal ; Scheduled Time: 1700  Tirso Portillo RN

## 2019-03-13 PROBLEM — O26.892 ABDOMINAL PAIN DURING PREGNANCY IN SECOND TRIMESTER: Status: RESOLVED | Noted: 2019-01-04 | Resolved: 2019-03-13

## 2019-03-13 PROBLEM — R10.9 ABDOMINAL PAIN DURING PREGNANCY IN SECOND TRIMESTER: Status: RESOLVED | Noted: 2019-01-04 | Resolved: 2019-03-13

## 2019-04-01 PROBLEM — O26.893 ABDOMINAL PAIN DURING PREGNANCY, THIRD TRIMESTER: Status: RESOLVED | Noted: 2019-02-06 | Resolved: 2019-04-01

## 2019-04-01 PROBLEM — R10.9 ABDOMINAL PAIN DURING PREGNANCY, THIRD TRIMESTER: Status: RESOLVED | Noted: 2019-02-06 | Resolved: 2019-04-01

## 2019-04-13 ENCOUNTER — HOSPITAL ENCOUNTER (OUTPATIENT)
Age: 34
Discharge: HOME | End: 2019-04-13
Attending: OBSTETRICS & GYNECOLOGY | Admitting: OBSTETRICS & GYNECOLOGY
Payer: COMMERCIAL

## 2019-04-13 VITALS
HEIGHT: 68 IN | RESPIRATION RATE: 18 BRPM | DIASTOLIC BLOOD PRESSURE: 85 MMHG | TEMPERATURE: 97.3 F | HEART RATE: 107 BPM | BODY MASS INDEX: 26.07 KG/M2 | WEIGHT: 172 LBS | SYSTOLIC BLOOD PRESSURE: 125 MMHG

## 2019-04-13 PROBLEM — O47.9 UTERINE CONTRACTIONS DURING PREGNANCY: Status: ACTIVE | Noted: 2019-04-13

## 2019-04-13 PROCEDURE — 99283 EMERGENCY DEPT VISIT LOW MDM: CPT

## 2019-04-13 PROCEDURE — 59025 FETAL NON-STRESS TEST: CPT

## 2019-04-14 NOTE — DISCHARGE INSTRUCTIONS
Patient Education   Patient Education   Keep next OB appointment  Come back when contractions are every 3-5 minutes for 1-2 hours     Counting Your Baby's Kicks: Care Instructions  Your Care Instructions    Counting your baby's kicks is one way your doctor can tell that your baby is healthy. Most women--especially in a first pregnancy--feel their baby move for the first time between 16 and 22 weeks. The movement may feel like flutters rather than kicks. Your baby may move more at certain times of the day. When you are active, you may notice less kicking than when you are resting. At your prenatal visits, your doctor will ask whether the baby is active. In your last trimester, your doctor may ask you to count the number of times you feel your baby move. Follow-up care is a key part of your treatment and safety. Be sure to make and go to all appointments, and call your doctor if you are having problems. It's also a good idea to know your test results and keep a list of the medicines you take. How do you count fetal kicks? · A common method of checking your baby's movement is to count the number of kicks or moves you feel in 1 hour. Ten movements (such as kicks, flutters, or rolls) in 1 hour are normal. Some doctors suggest that you count in the morning until you get to 10 movements. Then you can quit for that day and start again the next day. · Pick your baby's most active time of day to count. This may be any time from morning to evening. · If you do not feel 10 movements in an hour, your baby may be sleeping. Wait for the next hour and count again. When should you call for help? Call your doctor now or seek immediate medical care if:    · You noticed that your baby has stopped moving or is moving much less than normal.    Watch closely for changes in your health, and be sure to contact your doctor if you have any problems. Where can you learn more?   Go to http://genaro-maria.info/. Enter L983 in the search box to learn more about \"Counting Your Baby's Kicks: Care Instructions. \"  Current as of: September 5, 2018  Content Version: 11.9  © 9836-4152 [employer]. Care instructions adapted under license by [employer] (which disclaims liability or warranty for this information). If you have questions about a medical condition or this instruction, always ask your healthcare professional. [address] any warranty or liability for your use of this information. Early Stage of Labor at Home: Care Instructions  Your Care Instructions    If you came to the hospital while in early labor, your doctor may have asked if you want to labor at home until your contractions are stronger. Many women stay at home during early labor. This is often the longest part of the birthing process. It may last up to 2 to 3 days. Contractions are mild to moderate and shorter (about 30 to 45 seconds). You can usually keep talking during them. Contractions may also be irregular, about 5 to 20 minutes apart. They may even stop for a while. It helps to stay as relaxed as you can during this time. You can spend some or all of your early labor at home or anywhere else you may be comfortable. If you live far from the hospital or birthing center, you may want to think about going somewhere nearby so you can get back to the hospital quickly. For some women, there may be benefits to staying home during early labor, such as avoiding medicines or procedures. As labor progresses, you'll shift from early labor to active labor. During this time, contractions get more intense. They occur more often, about every 2 to 3 minutes. They also last longer, about 50 to 70 seconds. You will feel them even when you change positions and walk or move around. It may be hard to tell if you are in active labor.  If you aren't sure, call your doctor or midwife. As your labor progresses, check in with your doctor or midwife about when to come back to the hospital or birthing center. You may have special instructions if your water broke or you tested positive for group B strep. Follow-up care is a key part of your treatment and safety. Be sure to make and go to all appointments, and call your doctor if you are having problems. It's also a good idea to know your test results and keep a list of the medicines you take. How can you care for yourself at home? · Get support. Having a support person with you from early labor until after childbirth can have a positive effect on childbirth. · Find distractions. During early labor, you can walk, play cards, watch TV, or listen to music to help take your mind off your contractions. · Ask your partner, labor , or  for a massage. Shoulder and low back massage during contractions may ease your pain. Strong massage of the back muscles (counterpressure) during contractions may help relieve the pain of back labor. Tell your labor  exactly where to push and how hard to push. · Use imagery. This means using your imagination to decrease your pain. For instance, to help manage pain, picture your contractions as waves rolling over you. Picture a peaceful place, such as a beach or mountain stream, to help you relax between contractions. · Change positions during labor. Walking, kneeling, or sitting on a big rubber ball (birth ball) are good options. · Use focused breathing techniques. Breathing in a rhythm can distract you from pain. · Take a warm shower or bath. Warm water may ease pain and stress. When should you call for help? Call 911 anytime you think you may need emergency care.  For example, call if:    · You passed out (lost consciousness).     · You have severe vaginal bleeding.     · You have severe pain in your belly or pelvis.     · You have had fluid gushing or leaking from your vagina and you know or think the umbilical cord is bulging into your vagina. If this happens, immediately get down on your knees so your rear end (buttocks) is higher than your head. This will decrease the pressure on the cord until help arrives.   [facility] your doctor now or seek immediate medical care if:    · You have new or worse signs of preeclampsia, such as:  ? Sudden swelling of your face, hands, or feet. ? New vision problems (such as dimness or blurring). ? A severe headache.     · You have any vaginal bleeding.     · You have belly pain or cramping.     · You have a fever.     · You have had regular contractions (with or without pain) for an hour. This means that you have 8 or more within 1 hour or 4 or more in 20 minutes after you change your position and drink fluids.     · You have a sudden release of fluid from your vagina.     · You have low back pain or pelvic pressure that does not go away.     · You notice that your baby has stopped moving or is moving much less than normal.    Watch closely for changes in your health, and be sure to contact your doctor if you have any problems. Where can you learn more? Go to http://genaro-maria.info/. Enter J026 in the search box to learn more about \"Early Stage of Labor at Home: Care Instructions. \"  Current as of: September 5, 2018  Content Version: 11.9  © 8253-5136 [employer]. Care instructions adapted under license by [employer] (which disclaims liability or warranty for this information). If you have questions about a medical condition or this instruction, always ask your healthcare professional. [place] [address] any warranty or liability for your use of this information.

## 2019-04-14 NOTE — PROGRESS NOTES
Patient discharged to home in stable condition with labor precautions, and kick counts. Patient and spouse verbalize understanding of instructions.

## 2019-04-14 NOTE — ED PROVIDER NOTES
Chief Complaint:      35 y.o. female at 37w0d  weeks gestation who is seen for contractions q 5-8 min x several hrs. Denies bleeding or LOF, reports good fetal movement. Pain not severe, pt concerned since this is her 3rd baby. Pregnancy uncomplicated thus far. Last check in office cx was 1 cm external os. HISTORY:    Social History     Substance and Sexual Activity   Sexual Activity Yes    Partners: Male    Birth control/protection: None    Comment: pregnant     Patient's last menstrual period was 07/19/2018 (approximate).     Social History     Socioeconomic History    Marital status:      Spouse name: oJjo Abarca Number of children: 1    Years of education: 15    Highest education level: Not on file   Occupational History    Not on file   Social Needs    Financial resource strain: Not on file    Food insecurity:     Worry: Not on file     Inability: Not on file   Next audience needs:     Medical: Not on file     Non-medical: Not on file   Tobacco Use    Smoking status: Never Smoker    Smokeless tobacco: Never Used   Substance and Sexual Activity    Alcohol use: No    Drug use: No    Sexual activity: Yes     Partners: Male     Birth control/protection: None     Comment: pregnant   Lifestyle    Physical activity:     Days per week: Not on file     Minutes per session: Not on file    Stress: Not on file   Relationships    Social connections:     Talks on phone: Not on file     Gets together: Not on file     Attends [employer] service: Not on file     Active member of club or organization: Not on file     Attends meetings of clubs or organizations: Not on file     Relationship status: Not on file    Intimate partner violence:     Fear of current or ex partner: Not on file     Emotionally abused: Not on file     Physically abused: Not on file     Forced sexual activity: Not on file   Other Topics Concern     Service Not Asked    Blood Transfusions Not Asked    Caffeine Concern Not Asked    Occupational Exposure Not Asked   [place] Hazards Not Asked    Sleep Concern Not Asked    Stress Concern Not Asked    Weight Concern Not Asked    Special Diet Not Asked    Back Care Not Asked    Exercise Not Asked    Bike Helmet Not Asked   [address] Not Asked    Self-Exams Not Asked   Social History Narrative    Not on file       Past Surgical History:   Procedure Laterality Date    HX OTHER SURGICAL      bone graft on upper jaw 2004    HX TONSILLECTOMY         Past Medical History:   Diagnosis Date    Asthma     History of chicken pox          ROS:  A 12 point review of symptoms negative except for chief complaint as described above. PHYSICAL EXAM:  Blood pressure 125/85, pulse (!) 107, temperature 97.3 °F (36.3 °C), resp. rate 18, height 5' 8\" (1.727 m), weight 78 kg (172 lb), last menstrual period 07/19/2018, currently breastfeeding. Constitutional: The patient appears well, alert, oriented x 3. Cardiovascular: Heart RRR, no murmurs. Respiratory: Lungs clear, no respiratory distress  GI: Abdomen soft, nontender, no guarding  No fundal tenderness  Musculoskeletal: no cva tenderness  Upper ext: no edema, reflexes +2  Lower ext: no edema, neg juan's, reflexes +2  Skin: no rashes or lesions  Psychiatric:Mood/ Affect: appropriate  Genitourinary: SVE: cl/50/-3  FHT: 130's cat 1  TOCO: mild contractions q 5-9 min    I personally reviewed pt's medical record including relevant labs and ultrasounds and fetal monitor strip    Assessment/Plan: false labor w reassuring fetal status. Stable for home, follow up in office as scheduled. Questions answered.

## 2019-04-28 ENCOUNTER — HOSPITAL ENCOUNTER (OUTPATIENT)
Age: 34
Discharge: HOME | End: 2019-04-28
Attending: OBSTETRICS & GYNECOLOGY | Admitting: OBSTETRICS & GYNECOLOGY
Payer: COMMERCIAL

## 2019-04-28 VITALS
HEART RATE: 116 BPM | TEMPERATURE: 98.9 F | DIASTOLIC BLOOD PRESSURE: 82 MMHG | RESPIRATION RATE: 18 BRPM | SYSTOLIC BLOOD PRESSURE: 124 MMHG

## 2019-04-28 PROBLEM — O42.90 AMNIOTIC FLUID LEAKING: Status: ACTIVE | Noted: 2019-04-28

## 2019-04-28 PROCEDURE — 99283 EMERGENCY DEPT VISIT LOW MDM: CPT

## 2019-04-28 NOTE — PROGRESS NOTES
Dr. Pranay Gill at [facility], [address] 2/40/-2. No fluid noted. Amnisure negative. Orders received, when NST reactive, pt may be discharged.

## 2019-04-28 NOTE — PROGRESS NOTES
Pt to room OB 2 for triage, assessment begins, EFM and [place] applied and tracing well. Amnisure collected.

## 2019-04-28 NOTE — DISCHARGE INSTRUCTIONS
Patient Education        Preparing for Childbirth: Care Instructions  Your Care Instructions    You are getting close to the birth of your child. For months, you've been taking care of yourself and the baby. Now you can still take steps that will help you have a healthy labor and birth. You can take classes to help you and your partner or  prepare for the birth. You also can talk with your doctor about what you would like to happen during your labor. Changes happen in the last 2 months of your pregnancy. Your baby becomes too big to move around easily inside the uterus and may seem to move less. At the end of your pregnancy, your baby probably will settle into a head-down position. You will likely feel some pressure in your pelvis as you get close to the birth. You may notice times when your belly tightens and becomes firm to the touch, then relaxes. These are called Tarrant Nieves contractions. They sometimes occur as often as every 10 to 20 minutes. These contractions usually stop when you are active. (True labor pains continue or increase if you move around.)  Rupture of your membranes (\"breaking of the water\") often is a sign that labor has started or is about to start. This happens when a hole or tear develops in the fluid-filled bag (amniotic sac) that surrounds and protects your baby. You may feel a huge gush of water or a steady trickle of fluid. Call your doctor or go to the hospital if you think this has happened. Contractions may start, or if you are already having contractions, they may get stronger. Follow-up care is a key part of your treatment and safety. Be sure to make and go to all appointments, and call your doctor if you are having problems. It's also a good idea to know your test results and keep a list of the medicines you take. How can you care for yourself at home? · Get plenty of rest.  · Take childbirth classes with your partner or .  You will learn relaxation exercises that are helpful during labor. You also will learn what you can do to manage labor pain. · If you have other children, take a class to learn how to help them adjust to the new baby. · Develop a written birth plan, if you choose to, keeping in mind that labor is hard to predict and your plan may change after labor begins. Some of what a birth plan may address includes:  ? Where you would like to have your baby. This includes the building and the room. It could be the hospital's birthing room, a separate birthing center, or your own home. ? Who you would like to assist with delivery of your baby. You may want your doctor, an obstetrician, or a certified nurse-midwife. Some women prefer a lay midwife or a  to provide support before and after delivery. ? Whether you want a , nurse, midwife, or  to give you support from early labor until after childbirth. ? What comfort measures you want. You may have to choose between walking around during labor or having the security of a heart monitor for your baby. You may want to listen to music during labor. You may know what position you want to be in (such as sitting, squatting, or reclining) for pushing. ? What pain relief you would like. There are several choices, so be sure to talk with your doctor about them. ? How you want you and your baby to spend the first few hours after birth.  ? You may want to keep your baby with you for at least 1 hour after birth for bonding and early breastfeeding. ? You may want the hospital to delay some infant care steps, such as a vitamin K injection, so that you have calming time with your baby. ? You may not want visitors, or you may want other family members there. · Know the early signs of labor, such as a steady ache low in your back. · If you are going to a hospital or clinic to have your baby, have your bag ready to go. ? Pack a nightgown, robe, panties, socks, and slippers.  You may want to bring your own soap, shampoo, brush, toothbrush, and toothpaste. Bring your own self-adhesive sanitary pads. ? In your baby's bag, bring an outfit, small blanket, and diapers. Have your car seat ready to go. When should you call for help? Watch closely for changes in your health, and be sure to contact your doctor if you have any questions about preparing for childbirth. Where can you learn more? Go to http://genaro-maria.info/. Enter I019 in the search box to learn more about \"Preparing for Childbirth: Care Instructions. \"  Current as of: September 5, 2018  Content Version: 11.9  © 8672-6679 [employer], "[employer]". Care instructions adapted under license by [employer] (which disclaims liability or warranty for this information). If you have questions about a medical condition or this instruction, always ask your healthcare professional. [address] any warranty or liability for your use of this information.

## 2019-04-28 NOTE — H&P
Chief Complaint: vaginal leaking of clear fluid      35 y.o. female  at 39w1d  weeks gestation who is seen for a single episode of vaginal leaking of clear fluid. This occurred 3 hours ago. There has been no further leaking since then. Pt notes good FM. She denies VB, gush of fluid, fevers, UTI or PEC symptoms, uterine ctx, or abdominal pain. HISTORY:    Social History     Substance and Sexual Activity   Sexual Activity Yes    Partners: Male    Birth control/protection: None    Comment: pregnant     Patient's last menstrual period was 2018 (approximate).     Social History     Socioeconomic History    Marital status:      Spouse name: Santa Medicine Number of children: 1    Years of education: 15    Highest education level: Not on file   Occupational History    Not on file   Social Needs    Financial resource strain: Not on file    Food insecurity:     Worry: Not on file     Inability: Not on file    Transportation needs:     Medical: Not on file     Non-medical: Not on file   Tobacco Use    Smoking status: Never Smoker    Smokeless tobacco: Never Used   Substance and Sexual Activity    Alcohol use: No    Drug use: No    Sexual activity: Yes     Partners: Male     Birth control/protection: None     Comment: pregnant   Lifestyle    Physical activity:     Days per week: Not on file     Minutes per session: Not on file    Stress: Not on file   Relationships    Social connections:     Talks on phone: Not on file     Gets together: Not on file     Attends Gnosticist service: Not on file     Active member of club or organization: Not on file     Attends meetings of clubs or organizations: Not on file     Relationship status: Not on file    Intimate partner violence:     Fear of current or ex partner: Not on file     Emotionally abused: Not on file     Physically abused: Not on file     Forced sexual activity: Not on file   Other Topics Concern   [address] Service Not Asked    Blood Transfusions Not Asked    Caffeine Concern Not Asked    Occupational Exposure Not Asked    Hobby Hazards Not Asked    Sleep Concern Not Asked    Stress Concern Not Asked    Weight Concern Not Asked    Special Diet Not Asked    Back Care Not Asked    Exercise Not Asked    Bike Helmet Not Asked   [address] Not Asked    Self-Exams Not Asked   Social History Narrative    Not on file       Past Surgical History:   Procedure Laterality Date    HX OTHER SURGICAL      bone graft on upper jaw 2004    HX TONSILLECTOMY         Past Medical History:   Diagnosis Date    Asthma     History of chicken pox          ROS:  An 8 point review of symptoms negative except for chief complaint as described above. PHYSICAL EXAM:  Blood pressure 124/82, pulse (!) 116, temperature 98.9 °F (37.2 °C), resp. rate 18, last menstrual period 07/19/2018, currently breastfeeding. Constitutional: The patient appears well, alert, oriented x 3. Cardiovascular: Heart RRR, no murmurs. Respiratory: Lungs clear, no respiratory distress  GI: Abdomen soft, nontender, no guarding  No fundal tenderness  Lower ext: no edema, neg juan's, reflexes +2  Psychiatric:Mood/ Affect: appropriate  Genitourinary: SVE: 2cm/40%/vtx/-2; no fluid in the vagina  FHT: Category 1 with mod variability  TOCO: no ctx  Amnisure is negative    I personally reviewed pt's medical record including relevant labs and ultrasounds    Assessment/Plan:  No evidence of SROM or labor. Once the FHR tracing is reactive, will discharge pt to home with labor precautions. Pt to f/u with her PObP early this week.

## 2019-05-06 ENCOUNTER — ANESTHESIA (OUTPATIENT)
Dept: LABOR AND DELIVERY | Age: 34
End: 2019-05-06
Payer: COMMERCIAL

## 2019-05-06 ENCOUNTER — ANESTHESIA EVENT (OUTPATIENT)
Dept: LABOR AND DELIVERY | Age: 34
End: 2019-05-06
Payer: COMMERCIAL

## 2019-05-06 ENCOUNTER — HOSPITAL ENCOUNTER (INPATIENT)
Age: 34
LOS: 1 days | Discharge: HOME | End: 2019-05-07
Attending: OBSTETRICS & GYNECOLOGY | Admitting: OBSTETRICS & GYNECOLOGY
Payer: COMMERCIAL

## 2019-05-06 PROBLEM — Z34.90 ENCOUNTER FOR PLANNED INDUCTION OF LABOR: Status: ACTIVE | Noted: 2019-05-06

## 2019-05-06 PROBLEM — O48.0 POST-TERM PREGNANCY, 40-42 WEEKS OF GESTATION: Status: ACTIVE | Noted: 2019-05-06

## 2019-05-06 PROBLEM — Z34.90 ENCOUNTER FOR PLANNED INDUCTION OF LABOR: Status: RESOLVED | Noted: 2019-05-06 | Resolved: 2019-05-06

## 2019-05-06 PROBLEM — O47.9 UTERINE CONTRACTIONS DURING PREGNANCY: Status: RESOLVED | Noted: 2019-04-13 | Resolved: 2019-05-06

## 2019-05-06 PROBLEM — O42.90 AMNIOTIC FLUID LEAKING: Status: RESOLVED | Noted: 2019-04-28 | Resolved: 2019-05-06

## 2019-05-06 PROBLEM — Z3A.40 40 WEEKS GESTATION OF PREGNANCY: Status: ACTIVE | Noted: 2019-05-06

## 2019-05-06 LAB
ABO + RH BLD: NORMAL
ARTERIAL PATENCY WRIST A: ABNORMAL
ARTERIAL PATENCY WRIST A: ABNORMAL
BASE DEFICIT BLD-SCNC: 2 MMOL/L
BASE DEFICIT BLD-SCNC: 2 MMOL/L
BDY SITE: ABNORMAL
BDY SITE: ABNORMAL
BLOOD GROUP ANTIBODIES SERPL: NORMAL
BODY TEMPERATURE: 98.6
BODY TEMPERATURE: 98.6
CO2 BLD-SCNC: 24 MMOL/L
CO2 BLD-SCNC: 29 MMOL/L
COLLECT TIME,HTIME: 1545
COLLECT TIME,HTIME: 1545
ERYTHROCYTE [DISTWIDTH] IN BLOOD BY AUTOMATED COUNT: 12.5 % (ref 11.9–14.6)
GAS FLOW.O2 O2 DELIVERY SYS: ABNORMAL L/MIN
GAS FLOW.O2 O2 DELIVERY SYS: ABNORMAL L/MIN
HCO3 BLD-SCNC: 27.1 MMOL/L (ref 22–26)
HCO3 BLDV-SCNC: 23 MMOL/L (ref 23–28)
HCT VFR BLD AUTO: 40.9 % (ref 35.8–46.3)
HGB BLD-MCNC: 13.8 G/DL (ref 11.7–15.4)
MCH RBC QN AUTO: 31.1 PG (ref 26.1–32.9)
MCHC RBC AUTO-ENTMCNC: 33.7 G/DL (ref 31.4–35)
MCV RBC AUTO: 92.1 FL (ref 79.6–97.8)
NRBC # BLD: 0 K/UL (ref 0–0.2)
PCO2 BLDCO: 40 MMHG (ref 32–68)
PCO2 BLDCO: 63 MMHG (ref 32–68)
PH BLDCO: 7.24 [PH] (ref 7.15–7.38)
PH BLDCO: 7.37 [PH] (ref 7.15–7.38)
PLATELET # BLD AUTO: 171 K/UL (ref 150–450)
PMV BLD AUTO: 11.4 FL (ref 9.4–12.3)
PO2 BLDCO: 11 MMHG
PO2 BLDCO: 27 MMHG
RBC # BLD AUTO: 4.44 M/UL (ref 4.05–5.2)
SAO2 % BLD: 8 % (ref 95–98)
SAO2 % BLDV: 49 % (ref 65–88)
SERVICE CMNT-IMP: ABNORMAL
SERVICE CMNT-IMP: ABNORMAL
SPECIMEN EXP DATE BLD: NORMAL
SPECIMEN TYPE: ABNORMAL
SPECIMEN TYPE: ABNORMAL
WBC # BLD AUTO: 10.1 K/UL (ref 4.3–11.1)

## 2019-05-06 PROCEDURE — 82803 BLOOD GASES ANY COMBINATION: CPT

## 2019-05-06 PROCEDURE — 76060000078 HC EPIDURAL ANESTHESIA

## 2019-05-06 PROCEDURE — 10907ZC DRAINAGE OF AMNIOTIC FLUID, THERAPEUTIC FROM PRODUCTS OF CONCEPTION, VIA NATURAL OR ARTIFICIAL OPENING: ICD-10-PCS | Performed by: OBSTETRICS & GYNECOLOGY

## 2019-05-06 PROCEDURE — A4300 CATH IMPL VASC ACCESS PORTAL: HCPCS | Performed by: NURSE ANESTHETIST, CERTIFIED REGISTERED

## 2019-05-06 PROCEDURE — 3E033VJ INTRODUCTION OF OTHER HORMONE INTO PERIPHERAL VEIN, PERCUTANEOUS APPROACH: ICD-10-PCS | Performed by: OBSTETRICS & GYNECOLOGY

## 2019-05-06 PROCEDURE — 74011250636 HC RX REV CODE- 250/636

## 2019-05-06 PROCEDURE — 74011000250 HC RX REV CODE- 250

## 2019-05-06 PROCEDURE — 77030011943

## 2019-05-06 PROCEDURE — 77030003544 HC NDL EPDRL BD -A: Performed by: NURSE ANESTHETIST, CERTIFIED REGISTERED

## 2019-05-06 PROCEDURE — 77030014125 HC TY EPDRL BBMI -B: Performed by: NURSE ANESTHETIST, CERTIFIED REGISTERED

## 2019-05-06 PROCEDURE — 65270000029 HC RM PRIVATE

## 2019-05-06 PROCEDURE — 75410000000 HC DELIVERY VAGINAL/SINGLE

## 2019-05-06 PROCEDURE — 77030011945 HC CATH URIN INT ST MENT -A

## 2019-05-06 PROCEDURE — 77030018846 HC SOL IRR STRL H20 ICUM -A

## 2019-05-06 PROCEDURE — 0HQ9XZZ REPAIR PERINEUM SKIN, EXTERNAL APPROACH: ICD-10-PCS | Performed by: OBSTETRICS & GYNECOLOGY

## 2019-05-06 PROCEDURE — 74011250636 HC RX REV CODE- 250/636: Performed by: OBSTETRICS & GYNECOLOGY

## 2019-05-06 PROCEDURE — 75410000003 HC RECOV DEL/VAG/CSECN EA 0.5 HR

## 2019-05-06 PROCEDURE — 85027 COMPLETE CBC AUTOMATED: CPT

## 2019-05-06 PROCEDURE — 86900 BLOOD TYPING SEROLOGIC ABO: CPT

## 2019-05-06 PROCEDURE — 77030003028 HC SUT VCRL J&J -A

## 2019-05-06 PROCEDURE — 74011250637 HC RX REV CODE- 250/637: Performed by: OBSTETRICS & GYNECOLOGY

## 2019-05-06 PROCEDURE — 75410000002 HC LABOR FEE PER 1 HR

## 2019-05-06 PROCEDURE — 3E0S3BZ INTRODUCTION OF ANESTHETIC AGENT INTO EPIDURAL SPACE, PERCUTANEOUS APPROACH: ICD-10-PCS | Performed by: ANESTHESIOLOGY

## 2019-05-06 RX ORDER — LIDOCAINE HYDROCHLORIDE 20 MG/ML
JELLY TOPICAL
Status: DISCONTINUED | OUTPATIENT
Start: 2019-05-06 | End: 2019-05-06 | Stop reason: HOSPADM

## 2019-05-06 RX ORDER — SODIUM CHLORIDE 0.9 % (FLUSH) 0.9 %
5-40 SYRINGE (ML) INJECTION EVERY 8 HOURS
Status: DISCONTINUED | OUTPATIENT
Start: 2019-05-06 | End: 2019-05-06

## 2019-05-06 RX ORDER — OXYTOCIN/RINGER'S LACTATE 30/500 ML
0-25 PLASTIC BAG, INJECTION (ML) INTRAVENOUS
Status: DISCONTINUED | OUTPATIENT
Start: 2019-05-06 | End: 2019-05-06 | Stop reason: HOSPADM

## 2019-05-06 RX ORDER — DEXTROSE, SODIUM CHLORIDE, SODIUM LACTATE, POTASSIUM CHLORIDE, AND CALCIUM CHLORIDE 5; .6; .31; .03; .02 G/100ML; G/100ML; G/100ML; G/100ML; G/100ML
125 INJECTION, SOLUTION INTRAVENOUS CONTINUOUS
Status: DISCONTINUED | OUTPATIENT
Start: 2019-05-06 | End: 2019-05-06

## 2019-05-06 RX ORDER — FENTANYL CITRATE 50 UG/ML
INJECTION, SOLUTION INTRAMUSCULAR; INTRAVENOUS AS NEEDED
Status: DISCONTINUED | OUTPATIENT
Start: 2019-05-06 | End: 2019-05-06 | Stop reason: HOSPADM

## 2019-05-06 RX ORDER — MINERAL OIL
120 OIL (ML) ORAL
Status: COMPLETED | OUTPATIENT
Start: 2019-05-06 | End: 2019-05-06

## 2019-05-06 RX ORDER — SODIUM CHLORIDE 0.9 % (FLUSH) 0.9 %
5-40 SYRINGE (ML) INJECTION AS NEEDED
Status: DISCONTINUED | OUTPATIENT
Start: 2019-05-06 | End: 2019-05-06

## 2019-05-06 RX ORDER — HYDROCODONE BITARTRATE AND ACETAMINOPHEN 5; 325 MG/1; MG/1
1-2 TABLET ORAL
Status: DISCONTINUED | OUTPATIENT
Start: 2019-05-06 | End: 2019-05-07 | Stop reason: HOSPADM

## 2019-05-06 RX ORDER — ZOLPIDEM TARTRATE 5 MG/1
5 TABLET ORAL
Status: DISCONTINUED | OUTPATIENT
Start: 2019-05-06 | End: 2019-05-07 | Stop reason: HOSPADM

## 2019-05-06 RX ORDER — NALOXONE HYDROCHLORIDE 0.4 MG/ML
0.4 INJECTION, SOLUTION INTRAMUSCULAR; INTRAVENOUS; SUBCUTANEOUS AS NEEDED
Status: DISCONTINUED | OUTPATIENT
Start: 2019-05-06 | End: 2019-05-07 | Stop reason: HOSPADM

## 2019-05-06 RX ORDER — ROPIVACAINE HYDROCHLORIDE 2 MG/ML
INJECTION, SOLUTION EPIDURAL; INFILTRATION; PERINEURAL
Status: DISCONTINUED | OUTPATIENT
Start: 2019-05-06 | End: 2019-05-06 | Stop reason: HOSPADM

## 2019-05-06 RX ORDER — OXYTOCIN/RINGER'S LACTATE 30/500 ML
1-25 PLASTIC BAG, INJECTION (ML) INTRAVENOUS
Status: DISCONTINUED | OUTPATIENT
Start: 2019-05-06 | End: 2019-05-06 | Stop reason: SDUPTHER

## 2019-05-06 RX ORDER — DIPHENHYDRAMINE HCL 25 MG
25-50 CAPSULE ORAL
Status: DISCONTINUED | OUTPATIENT
Start: 2019-05-06 | End: 2019-05-07 | Stop reason: HOSPADM

## 2019-05-06 RX ORDER — IBUPROFEN 800 MG/1
800 TABLET ORAL
Status: DISCONTINUED | OUTPATIENT
Start: 2019-05-06 | End: 2019-05-07 | Stop reason: HOSPADM

## 2019-05-06 RX ORDER — OXYTOCIN/RINGER'S LACTATE 15/250 ML
250 PLASTIC BAG, INJECTION (ML) INTRAVENOUS ONCE
Status: DISCONTINUED | OUTPATIENT
Start: 2019-05-06 | End: 2019-05-06

## 2019-05-06 RX ORDER — DOCUSATE SODIUM 100 MG/1
100 CAPSULE, LIQUID FILLED ORAL 2 TIMES DAILY
Status: DISCONTINUED | OUTPATIENT
Start: 2019-05-06 | End: 2019-05-07 | Stop reason: HOSPADM

## 2019-05-06 RX ORDER — LIDOCAINE HYDROCHLORIDE AND EPINEPHRINE 15; 5 MG/ML; UG/ML
INJECTION, SOLUTION EPIDURAL AS NEEDED
Status: DISCONTINUED | OUTPATIENT
Start: 2019-05-06 | End: 2019-05-06 | Stop reason: HOSPADM

## 2019-05-06 RX ORDER — SIMETHICONE 80 MG
80 TABLET,CHEWABLE ORAL
Status: DISCONTINUED | OUTPATIENT
Start: 2019-05-06 | End: 2019-05-07 | Stop reason: HOSPADM

## 2019-05-06 RX ORDER — ZOLPIDEM TARTRATE 5 MG/1
10 TABLET ORAL
Status: DISCONTINUED | OUTPATIENT
Start: 2019-05-06 | End: 2019-05-06

## 2019-05-06 RX ORDER — BUTORPHANOL TARTRATE 2 MG/ML
1 INJECTION INTRAMUSCULAR; INTRAVENOUS
Status: DISCONTINUED | OUTPATIENT
Start: 2019-05-06 | End: 2019-05-06 | Stop reason: HOSPADM

## 2019-05-06 RX ORDER — ROPIVACAINE HYDROCHLORIDE 2 MG/ML
INJECTION, SOLUTION EPIDURAL; INFILTRATION; PERINEURAL AS NEEDED
Status: DISCONTINUED | OUTPATIENT
Start: 2019-05-06 | End: 2019-05-06 | Stop reason: HOSPADM

## 2019-05-06 RX ORDER — LIDOCAINE HYDROCHLORIDE 10 MG/ML
1 INJECTION INFILTRATION; PERINEURAL
Status: DISCONTINUED | OUTPATIENT
Start: 2019-05-06 | End: 2019-05-06 | Stop reason: HOSPADM

## 2019-05-06 RX ADMIN — FENTANYL CITRATE 100 MCG: 50 INJECTION, SOLUTION INTRAMUSCULAR; INTRAVENOUS at 12:27

## 2019-05-06 RX ADMIN — MINERAL OIL 120 ML: 471.95 OIL ORAL at 15:40

## 2019-05-06 RX ADMIN — SODIUM CHLORIDE, SODIUM LACTATE, POTASSIUM CHLORIDE, CALCIUM CHLORIDE, AND DEXTROSE MONOHYDRATE 125 ML/HR: 600; 310; 30; 20; 5 INJECTION, SOLUTION INTRAVENOUS at 09:21

## 2019-05-06 RX ADMIN — DOCUSATE SODIUM 100 MG: 100 CAPSULE, LIQUID FILLED ORAL at 19:35

## 2019-05-06 RX ADMIN — OXYTOCIN 2 MILLI-UNITS/MIN: 10 INJECTION, SOLUTION INTRAMUSCULAR; INTRAVENOUS at 10:16

## 2019-05-06 RX ADMIN — ROPIVACAINE HYDROCHLORIDE 10 ML/HR: 2 INJECTION, SOLUTION EPIDURAL; INFILTRATION; PERINEURAL at 12:30

## 2019-05-06 RX ADMIN — ROPIVACAINE HYDROCHLORIDE 5 ML: 2 INJECTION, SOLUTION EPIDURAL; INFILTRATION; PERINEURAL at 12:26

## 2019-05-06 RX ADMIN — LIDOCAINE HYDROCHLORIDE AND EPINEPHRINE 5 ML: 15; 5 INJECTION, SOLUTION EPIDURAL at 12:24

## 2019-05-06 NOTE — PROGRESS NOTES
Patient up to bathroom with 2 person assistance. Radha-care taught and completed. Questions encouraged and answered. Patient back to bed, encouraged to call for needs or concerns. Verbalizes understanding.

## 2019-05-06 NOTE — ANESTHESIA PREPROCEDURE EVALUATION
Relevant Problems No relevant active problems Anesthetic History Review of Systems / Medical History Patient summary reviewed, nursing notes reviewed and pertinent labs reviewed Pulmonary Asthma : well controlled Neuro/Psych Cardiovascular Exercise tolerance: >4 METS 
  
GI/Hepatic/Renal 
  
 
 
 
 
 
 Endo/Other Other Findings Physical Exam 
 
Airway Mallampati: I 
TM Distance: 4 - 6 cm Neck ROM: normal range of motion Mouth opening: Normal 
 
 Cardiovascular Dental 
 
 
  
Pulmonary Abdominal 
GI exam deferred Other Findings Anesthetic Plan ASA: 1 Anesthesia type: epidural 
 
 
 
 
 
Anesthetic plan and risks discussed with: Patient and Spouse

## 2019-05-06 NOTE — PROGRESS NOTES
sbar to [facility], [facility]. Pt assumed for care.

## 2019-05-06 NOTE — PROGRESS NOTES
Admission assessment complete as noted. Patient oriented to room and unit. Plan of care reviewed and patient verbalizes understanding. Questions encouraged and answered. Patent encouraged to call for needs or concerns. Safety Teaching reviewed:  
1. Hand hygiene prior to handling the infant. 2. Bracelets with matching numbers are placed on mother and infant 3. An infant security tag  [facility]) is placed on the infant's ankle and monitored 4. All OB nurses wear pink Employee badges - do not give your baby to anyone without proper identification. 5. Never leave the baby alone in the room. 6. The infant should be placed on their back to sleep. on a firm mattress. No toys should be placed in the crib. (safe sleep video offered to view) 7. Never shake the baby (video offered to view) 8. Infant fall prevention - do not sleep with the baby, and place the baby in the crib while ambulating. 5. Mother and Baby Care booklet given to Mother. Declined viewing video at this time

## 2019-05-06 NOTE — PROGRESS NOTES
EPIDURAL PLACEMENT Dr Sole Hinds at bedside at [phone]. JOHNNY Benavides at bedside at [phone] Assisted pt to sitting up on bedside at 1212. Timeout completed at 26 with JOHNNY LOUIS and myself at bedside. Test dose given at 1224. Negative reaction. Dose given at 1228. Pt assisted to lying back in left tilt position. See anesthesia record for details. See vital sign flow sheet for BP. Tolerated procedure well.

## 2019-05-06 NOTE — ANESTHESIA PROCEDURE NOTES
Epidural Block Start time: 5/6/2019 12:16 PM 
End time: 5/6/2019 12:22 PM 
Performed by: Kb Moore MD 
Authorized by: Kb Moore MD  
 
Pre-Procedure Indication: labor epidural   
Preanesthetic Checklist: patient identified, risks and benefits discussed, anesthesia consent, site marked, patient being monitored, timeout performed and anesthesia consent Timeout Time: 12:16 Epidural:  
Patient position:  Seated Prep region:  Lumbar Prep: Patient draped and Chlorhexidine Location:  L3-4 Needle and Epidural Catheter:  
Needle Type:  Tuohy Needle Gauge:  17 G Injection Technique:  Loss of resistance using air Attempts:  1 Catheter Size:  19 G Depth in Epidural Space (cm):  4 Events: no blood with aspiration, no cerebrospinal fluid with aspiration, no paresthesia and negative aspiration test   
Test Dose:  Negative Assessment:  
Catheter Secured:  Tegaderm and tape Insertion:  Uncomplicated Patient tolerance:  Patient tolerated the procedure well with no immediate complications

## 2019-05-06 NOTE — PROGRESS NOTES
Admission completed, consents witnessed. Pt oriented to room and plan of care. No questions at this time

## 2019-05-06 NOTE — PROGRESS NOTES
Pt set up for delivery. 1545-  viable female. Apgars 8-9 wt 8-1.   
1602- Placenta delivered. Pitocin infusing.

## 2019-05-06 NOTE — ROUTINE PROCESS
SBAR OUT Report: Mother Verbal report given to FRED Lemus (full name & credentials) on this patient, who is now being transferred to MIU (unit) for routine progression of care. The patient is not wearing a green \"Anesthesia-Duramorph\" band. Report consisted of patient's Situation, Background, Assessment and Recommendations (SBAR).  ID bands were compared with the identification form, and verified with the patient and receiving nurse. Information from the SBAR and the [facility] Report was reviewed with the receiving nurse; opportunity for questions and clarification provided.

## 2019-05-06 NOTE — ROUTINE PROCESS
SBAR IN Report: Mother Verbal report received from Bushra Bautista RN (full name & credentials) on this patient, who is now being transferred from L&D (unit) for routine progression of care. The patient is not wearing a green \"Anesthesia-Duramorph\" band. Report consisted of patient's Situation, Background, Assessment and Recommendations (SBAR). [place] ID bands were compared with the identification form, and verified with the patient and transferring nurse. Information from the SBAR, Kardex, Procedure Summary, Intake/Output, MAR and Recent Results and the [place] Report was reviewed with the transferring nurse; opportunity for questions and clarification provided.

## 2019-05-06 NOTE — H&P
History & Physical 
 
Name: Makeda Galarza MRN: [id number]  SSN: [id number] YOB: 1985  Age: 35 y.o. Sex: female Subjective:  
 
Estimated Date of Delivery: 19 OB History  Para Term  AB Living 3 2 2     2 SAB TAB Ectopic Molar Multiple Live Births  
        0 2 # Outcome Date GA Lbr Bernardo/2nd Weight Sex Delivery Anes PTL Lv  
3 Current 2 Term 08/04/15 41w1d 05:45 / 00:29 8 lb 1 oz (3.657 kg) M VAGINAL DELI EPIDURAL AN N PATRICIO  
1 Term 14 40w1d 19:20 / 01:18 8 lb 1.8 oz (3.68 kg) F VAGINAL DELI EPIDURAL AN N PATRICIO Ms. Ashli Cardozo is admitted with pregnancy at 40w2d for induction of labor due to post dates. Prenatal course was normal.  Please see prenatal records for details. Past Medical History:  
Diagnosis Date  Asthma   
 History of chicken pox Past Surgical History:  
Procedure Laterality Date  HX OTHER SURGICAL    
 bone graft on upper jaw   HX TONSILLECTOMY Social History Occupational History  Not on file Tobacco Use  Smoking status: Never Smoker  Smokeless tobacco: Never Used Substance and Sexual Activity  Alcohol use: No  
 Drug use: No  
 Sexual activity: Yes  
  Partners: Male Birth control/protection: None Comment: pregnant Family History Problem Relation Age of Onset  Hypertension Mother  Elevated Lipids Mother  Depression Father  Hypertension Father Allergies Allergen Reactions  Sulfa (Sulfonamide Antibiotics) Rash Prior to Admission medications Medication Sig Start Date End Date Taking? Authorizing Provider  
polyethylene glycol (MIRALAX) 17 gram/dose powder Take 17 g by mouth daily. Yes Provider, Historical  
PNV53-iron-FA-docusate Ca-dha (NEXA PLUS) 29 mg iron-1.25 mg-55 mg cap Take  by mouth. Yes Provider, Historical  
  
 
Review of Systems: Pertinent items are noted in the History of Present Illness. Objective: Vitals: 
Vitals:  
 05/06/19 0915 Weight: 177 lb (80.3 kg) Height: 5' 8\" (1.727 m) Physical Exam: 
Patient without distress. Heart: Regular rate and rhythm Lung: clear to auscultation throughout lung fields, no wheezes, no rales, no rhonchi and normal respiratory effort Abdomen: soft, nontender Fundus: soft and non tender Cervical Exam: 2/40/-3 Membranes:  Artificial Rupture of Membranes; Amniotic Fluid: medium amount of thin meconium fluid Fetal Heart Rate: Reactive Prenatal Labs:  
Lab Results Component Value Date/Time ABO/Rh(D) AB POSITIVE 08/04/2015 10:08 AM  
 Rubella, External immune 12/02/2014 HBsAg, External negaitve 12/02/2014 HIV, External negaitve 12/02/2014 RPR, External non reactive 12/02/2014 Gonorrhea, External Negative 10/22/2018 Chlamydia, External Negative 10/22/2018 ABO,Rh AB + 06/11/2013 GrBStrep, External Negative 04/10/2019 Impression/Plan:  
 
Principal Problem: 
  Post-term pregnancy, 40-42 weeks of gestation (5/6/2019) Active Problems: 
  Prenatal care of multigravida, antepartum (9/29/2018) Overview: 1)prenatal labs wnl Plan: Admit for induction of labor. Group B Strep negative.

## 2019-05-06 NOTE — L&D DELIVERY NOTE
Delivery Summary    Patient: Mirna Padilla MRN: [id number]  SSN: [id number]    YOB: 1985  Age: 35 y.o. Sex: female       Information for the patient's :  Anatoly Burrows [[id number]]       Labor Events:    Labor: No    Steroids: None   Cervical Ripening Date/Time:       Cervical Ripening Type: None   Antibiotics During Labor:     Rupture Identifier:      Rupture Date/Time: 2019 9:40 AM   Rupture Type: AROM   Amniotic Fluid Volume: Moderate    Amniotic Fluid Description: Meconium    Amniotic Fluid Odor: None    Induction: AROM; Oxytocin       Induction Date/Time: 2019      Indications for Induction: Post-term Gestation    Augmentation:     Augmentation Date/Time:      Indications for Augmentation:     Labor complications: None       Additional complications:        Delivery Events:  Indications For Episiotomy:     Episiotomy: None   Perineal Laceration(s):     Repaired:     Periurethral Laceration Location:      Repaired:     Labial Laceration Location:     Repaired:     Sulcal Laceration Location:     Repaired:     Vaginal Laceration Location:     Repaired: Yes   Cervical Laceration Location:     Repaired:     Repair Suture: Vicryl 3-0   Number of Repair Packets: 1   Estimated Blood Loss (ml): 450ml     Delivery Date: 2019    Delivery Time: 3:45 PM  Delivery Type: Vaginal, Spontaneous  Sex:  Female    Gestational Age: 41w4d   Delivery Clinician:  Linus Heath  Living Status: Living   Delivery Location: L&D            APGARS  One minute Five minutes Ten minutes   Skin color: 0   1        Heart rate: 2   2        Grimace: 2   2        Muscle tone: 2   2        Breathin   2        Totals: 8   9            Presentation: Vertex    Position:   Occiput Anterior  Resuscitation Method:  Suctioning-bulb; Tactile Stimulation     Meconium Stained: Thin      Cord Information: 3 Vessels  Complications: None  Cord around:    Delayed cord clamping?  No  Cord clamped date/time:   Disposition of Cord Blood: Lab    Blood Gases Sent?: Yes    Placenta:  Date/Time: 2019  4:02 PM  Removal: Spontaneous      Appearance: Normal;Intact      Measurements:  Birth Weight: 8 lb 0.6 oz (3.645 kg)      Birth Length: 53 cm      Head Circumference: 34 cm      Chest Circumference: 33.5 cm     Abdominal Girth: Other Providers:   Beka Samples A;ALVA ACOSTA;NINFA NDIAYE;;FORT, ISABELA;;;;;;;MCKINLEY LANGLEY, Obstetrician;Primary Nurse;Primary  Nurse;Nicu Nurse;Neonatologist;Anesthesiologist;Crna;Nurse Practitioner;Midwife;Nursery Nurse;Scrub Tech;Charge Nurse;Respiratory Therapist           Group B Strep:   Lab Results   Component Value Date/Time    GrBStrep, External Negative 04/10/2019     Information for the patient's :  Eloisa Maloney [[id number]]   No results found for: ABORHNicolas Luis Daniel, PCTDIG, BILI, ABORHEXT, ABORH    Recent Labs     19  1559 19  1558   PCO2CB 40 63   PO2CB 27 11   HCO3I  --  27.1*   SO2I  --  8*   IBD 2 2   PTEMPI 98.6 98.6   SPECTI VENOUS CORD ARTERIAL CORD   PHICB 7.373 7.243   ISITE CORD CORD   IDEV OTHER OTHER   IALLEN NOT APPLICABLE NOT APPLICABLE      Delivery Note        Lab Results   Component Value Date/Time    PH,CORD BLD ARTERIAL 7.215 2014 09:08 PM    PCO2,CORD BLD ARTERIAL 56 (H) 2014 09:08 PM    PO2,CORD BLD ARTERIAL <36 (H) 2014 09:08 PM    HCO3,CORD BLD ARTERIAL 22 2014 09:08 PM    BASE DEFICIT,CBA 6.3 (H) 2014 09:08 PM    SITE CORD 2014 09:08 PM    Respiratory comment: n a at 2 5  9 33 59 PM. Not read back. 2014 09:08 PM            Lab Results   Component Value Date/Time    Rubella, External immune 2014    GrBStrep, External Negative 04/10/2019            Procedure:  Patient became completely dilated and pushed. Episiotomy was not performed. Patient delivered head.   Mouth and nares suctioned on the perineum with bulb syringe. The posterior arm also presented with the head. This was resolved with Andreas and fetal position changes. Shoulders delivered without any evidence of dystocia. Baby delivered in the bed, was dried. The cord was clamped and cut. Neonatology was at bedside and took the  to the isolette. Cord pH and cord blood was obtained. The baby was taken to the isolette. The perineum was examined. There was on small midline tear at the fourchette that was repaired with 2 figure of 8 sutures of 3.0 vicryl. The placenta was delivered spontaneously. It was examined. It was intact with three vessels. Mom and baby are doing well.          Ivy Jesus MD  2019  4:19 PM

## 2019-05-07 VITALS
BODY MASS INDEX: 26.83 KG/M2 | RESPIRATION RATE: 17 BRPM | HEIGHT: 68 IN | TEMPERATURE: 98.4 F | DIASTOLIC BLOOD PRESSURE: 80 MMHG | WEIGHT: 177 LBS | HEART RATE: 92 BPM | SYSTOLIC BLOOD PRESSURE: 119 MMHG | OXYGEN SATURATION: 100 %

## 2019-05-07 PROCEDURE — 74011250637 HC RX REV CODE- 250/637: Performed by: OBSTETRICS & GYNECOLOGY

## 2019-05-07 RX ADMIN — IBUPROFEN 800 MG: 800 TABLET ORAL at 00:16

## 2019-05-07 RX ADMIN — DOCUSATE SODIUM 100 MG: 100 CAPSULE, LIQUID FILLED ORAL at 07:06

## 2019-05-07 RX ADMIN — IBUPROFEN 800 MG: 800 TABLET ORAL at 10:29

## 2019-05-07 NOTE — PROGRESS NOTES
Report received from [address] [place] [address], [facility].

## 2019-05-07 NOTE — DISCHARGE INSTRUCTIONS
Discharge instruction to follow: Activity: Pelvis rest for 6 weeks     No heavy lifting over 15 lbs for 2 weeks     No driving for 2 weeks     No push/pull motion such as sweeping or vacuuming for 2 weeks     No tub baths for 6 weeks    Continue using the hygenique wand after each void or bowel movement. If using sitz bath continue until comfortable stopping. If using yogesh-bottle continue to use until comfortable stopping. Change sanitary pad after each urination or bowel movement. Call MD for the following:      Fever over 101 F; pain not relieved by medication; foul smelling vaginal discharge or an increase in vaginal bleeding. Take medication as prescribed. Follow up with MD as order. Vaginal Childbirth: Care Instructions  Your Care Instructions    Your body will slowly heal in the next few weeks. It is easy to get too tired and overwhelmed during the first weeks after your baby is born. Changes in your hormones can shift your mood without warning. You may find it hard to meet the extra demands on your energy and time. Take it easy on yourself. Follow-up care is a key part of your treatment and safety. Be sure to make and go to all appointments, and call your doctor if you are having problems. It's also a good idea to know your test results and keep a list of the medicines you take. How can you care for yourself at home? · Vaginal bleeding and cramps  ? After delivery, you will have a bloody discharge from the vagina. This will turn pink within a week and then white or yellow after about 10 days. It may last for 2 to 4 weeks or longer, until the uterus has healed. Use pads instead of tampons until you stop bleeding. ? Do not worry if you pass some blood clots, as long as they are smaller than a golf ball. If you have a tear or stitches in your vaginal area, change the pad at least every 4 hours to prevent soreness and infection. ? You may have cramps for the first few days after childbirth. These are normal and occur as the uterus shrinks to normal size. Take an over-the-counter pain medicine, such as acetaminophen (Tylenol), ibuprofen (Advil, Motrin), or naproxen (Aleve), for cramps. Read and follow all instructions on the label. Do not take aspirin, because it can cause more bleeding. ? Do not take two or more pain medicines at the same time unless the doctor told you to. Many pain medicines have acetaminophen, which is Tylenol. Too much acetaminophen (Tylenol) can be harmful. · Stitches  ? If you have stitches, they will dissolve on their own and do not need to be removed. Follow your doctor's instructions for cleaning the stitched area. ? Put ice or a cold pack on your painful area for 10 to 20 minutes at a time, several times a day, for the first few days. Put a thin cloth between the ice and your skin. ? Sit in a few inches of warm water (sitz bath) 3 times a day and after bowel movements. The warm water helps with pain and itching. If you do not have a tub, a warm shower might help. · Breast fullness  ? Your breasts may overfill (engorge) in the first few days after delivery. To help milk flow and to relieve pain, warm your breasts in the shower or by using warm, moist towels before nursing. ? If you are not nursing, do not put warmth on your breasts or touch your breasts. Wear a tight bra or sports bra and use ice until the fullness goes away. This usually takes 2 to 3 days. ? Put ice or a cold pack on your breast after nursing to reduce swelling and pain. Put a thin cloth between the ice and your skin. · Activity  ? Eat a balanced diet. Do not try to lose weight by cutting calories. Keep taking your prenatal vitamins, or take a multivitamin. ? Get as much rest as you can. Try to take naps when your baby sleeps during the day. ? Get some exercise every day. But do not do any heavy exercise until your doctor says it is okay.   ? Wait until you are healed (about 4 to 6 weeks) before you have sexual intercourse. Your doctor will tell you when it is okay to have sex. ? Talk to your doctor about birth control. You can get pregnant even before your period returns. Also, you can get pregnant while you are breastfeeding. · Mental health  ? It is normal to have some sadness, anxiety, sleeplessness, and mood swings after you go home. If you feel upset or hopeless for more than a few days or are having trouble doing the things you need to do, talk to your doctor. · Constipation and hemorrhoids  ? Drink plenty of fluids, enough so that your urine is light yellow or clear like water. If you have kidney, heart, or liver disease and have to limit fluids, talk with your doctor before you increase the amount of fluids you drink. ? Eat plenty of fiber each day. Have a bran muffin or bran cereal for breakfast, and try eating a piece of fruit for a mid-afternoon snack. ? For painful, itchy hemorrhoids, put ice or a cold pack on the area several times a day for 10 minutes at a time. Follow this by putting a warm compress on the area for another 10 to 20 minutes or by sitting in a shallow, warm bath. When should you call for help? Call 911 anytime you think you may need emergency care. For example, call if:    · You passed out (lost consciousness).    Call your doctor now or seek immediate medical care if:    · You have severe vaginal bleeding.     · You are dizzy or lightheaded, or you feel like you may faint.     · You have a fever.     · You have new or more pain in your belly or pelvis.    Watch closely for changes in your health, and be sure to contact your doctor if:    · Your vaginal bleeding seems to be getting heavier.     · You have new or worse vaginal discharge.     · You feel sad, anxious, or hopeless for more than a few days.     · You do not get better as expected. Where can you learn more? Go to http://genaro-maria.info/.   Enter Z075 in the search box to learn more about \"Vaginal Childbirth: Care Instructions. \"  Current as of: September 5, 2018  Content Version: 11.9  © 4286-4192 [employer]. Care instructions adapted under license by [employer] (which disclaims liability or warranty for this information). If you have questions about a medical condition or this instruction, always ask your healthcare professional. [address] any warranty or liability for your use of this information.

## 2019-05-07 NOTE — LACTATION NOTE
Mom and baby are going home today. Continue to offer the breast without restriction. Mom's milk should be fully in over the next few days. Reviewed engorgement precautions. Hand Expression has been demoed and written hand-out reviewed. As milk comes in baby will be more alert at the breast and swallows will be more obvious. Breasts may feel softer once baby has finished nursing. Baby should be back to birth weight by 3weeks of age. And then gain on average 1 oz per day for the next 2-3 months. Reviewed babies should be exclusively breastfeeding for the first 6 months and that breastfeeding should continue after introduction of appropriate complimentary foods after 6 months. Initial output should be at least 1 wet and 1 bowel movement for each day old baby is. By day 5-7 once milk is fully in baby will consistently have 6 or more soaking wet diapers and about 4 bowel movement. Some babies have a bowel movement with every feeding and some have 1-3 large bowel movements each day. Inadequate output may indicate inadequate feedings and should be reported to your Pediatrician. Bowel habits may change as baby gets older. Encouraged follow-up at Pediatrician in 1-2 days, no later than 1 week of age. Call [facility] for any questions as needed or to set up an OP visit. OP phone calls are returned within 24 hours. Community Breastfeeding Resource List given.

## 2019-05-07 NOTE — DISCHARGE SUMMARY
Obstetrical Discharge Summary     Name: Onesimo Nicole MRN: [id number]  SSN: [id number]    YOB: 1985  Age: 35 y.o. Sex: female      Allergies: Sulfa (sulfonamide antibiotics)    Admit Date: 2019    Discharge Date: 2019     Admitting Physician: Rupinder Moreno MD     Attending Physician:  Rickie Seo MD     * Admission Diagnoses: 40 weeks gestation of pregnancy [Z3A.40]; Encounter for planned induction of labor [Z34.90]    * Discharge Diagnoses:   Information for the patient's :  Iram Luis [[id number]]   Delivery of a 8 lb 0.6 oz (3.645 kg) female infant via Vaginal, Spontaneous on 2019 at 3:45 PM  by . Apgars were 8 and 9. Additional Diagnoses:   Hospital Problems as of 2019 Date Reviewed: 2019          Codes Class Noted - Resolved POA    * (Principal) Post-term pregnancy, 40-42 weeks of gestation ICD-10-CM: O48.0  ICD-9-CM: 645.10  2019 - Present Unknown        Prenatal care of multigravida, antepartum ICD-10-CM: Z34.80  ICD-9-CM: V22.1  2018 - Present Yes    Overview Signed 2018  8:30 PM by Violet Tenorio MD     1)prenatal labs wnl             RESOLVED: Encounter for planned induction of labor ICD-10-CM: Z34.90  ICD-9-CM: V22.1  2019 - 2019 Unknown             Lab Results   Component Value Date/Time    ABO/Rh(D) AB POSITIVE 2019 09:21 AM    Rubella, External immune 2014    GrBStrep, External Negative 04/10/2019    ABO,Rh AB + 2013      Immunization History   Administered Date(s) Administered    Influenza Vaccine (Quad) 10/01/2018    Influenza Vaccine (Quad) PF 10/08/2018       * Procedures:   * No surgery found *           * Discharge Condition: good    * Hospital Course: Normal hospital course following the delivery.     * Disposition: Home    Discharge Medications:   Current Discharge Medication List      CONTINUE these medications which have NOT CHANGED    Details   polyethylene glycol (MIRALAX) 17 gram/dose powder Take 17 g by mouth daily. PNV53-iron-FA-docusate Ca-dha (NEXA PLUS) 29 mg iron-1.25 mg-55 mg cap Take  by mouth. * Follow-up Care/Patient Instructions: Activity: No lifting, Driving, or Strenuous exercise for 2-4 weeks, No sex for 6 weeks and No driving while on analgesics  Diet: Regular Diet  Wound Care: As directed    D/w pt:  Infx/driving/physical activity/pelvic rest precautions    Pelvic rest x 6 wk ( no sex, swimming, tampons or douching)  Pt may drive after 2-4 weeks as long as she is NOT taking narcotics & can quickly maneuver her foot from the gas to the brake. For the next 2-4 weeks:  eat, shower and nurse the baby. Advance activity as tolerated. Notify [facility] for temp > 100.5, vaginal discharge with odor, heavy VB, worsening pelvic/abdominal pain and/or other concerns.        Follow-up Information     Follow up With Specialties Details [address], Millicent [address] 81, DO [address]  [phone]

## 2019-05-07 NOTE — PROGRESS NOTES
Post-Partum Day Number 1 Progress Note Patient doing well post-partum day #1 without significant complaint. Voiding without difficulty, normal lochia. Patient Vitals for the past 24 hrs: 
 Temp Pulse Resp BP SpO2  
05/06/19 1940 97.8 °F (36.6 °C) 99 18 110/70 96 % 05/06/19 1840 98.5 °F (36.9 °C) (!) 107 20 106/62 97 % 05/06/19 1759 98.3 °F (36.8 °C) (!) 107 18 109/66 97 % 05/06/19 1748  98  111/66   
05/06/19 1733  (!) 104  118/67   
05/06/19 1718  (!) 101  114/68   
05/06/19 1703  (!) 103  115/66   
05/06/19 1648  (!) 101  112/68   
05/06/19 1633  96  107/61   
05/06/19 1618  (!) 114  108/69   
05/06/19 1603  (!) 107  (!) 89/51   
05/06/19 1548  (!) 112  111/61   
05/06/19 1519  (!) 112  128/75   
05/06/19 1504  86  119/63   
05/06/19 1449  86  104/62   
05/06/19 1444 98.8 °F (37.1 °C)      
05/06/19 1434  71  98/52   
05/06/19 1419  95  103/60   
05/06/19 1404  77  92/51   
05/06/19 1349  86  115/65   
05/06/19 1335  85  107/63   
05/06/19 1315  100  101/56   
05/06/19 1311  (!) 103  103/60   
05/06/19 1307  90  106/59   
05/06/19 1302  96  107/63   
05/06/19 1255  98  104/59   
05/06/19 1250  91  110/58   
05/06/19 1245  96  115/65   
05/06/19 1242  93  104/55   
05/06/19 1240  (!) 120  107/57   
05/06/19 1235  (!) 110  101/57   
05/06/19 1230  99  106/61   
05/06/19 1229  97  110/69   
05/06/19 1227  (!) 112  112/72   
05/06/19 1226  (!) 112  125/87   
05/06/19 1225  (!) 116  122/83   
05/06/19 1214  (!) 107  135/82   
05/06/19 1143  100  117/80   
05/06/19 1113  96  115/60   
05/06/19 1043  88  116/67   
05/06/19 1015    108/63  Exam:  Patient without distress. Abdomen soft, fundus firm at level of umbilicus, nontender Perineum with normal lochia noted.  
             Lower extremities are negative for swelling, cords or tenderness. Recent Labs 19 
0921 19 
1043 18 
1010 WBC 10.1 10.7 8.8 HGB 13.8 12.9 14.9 HCT 40.9 38.8 45.1  172 189 Prenatal Labs:   
Lab Results Component Value Date/Time  
 Rubella, External immune 2014 GrBStrep, External Negative 04/10/2019 HBsAg, External negaitve 2014 HIV, External negaitve 2014 RPR, External non reactive 2014 Gonorrhea, External Negative 10/22/2018 Chlamydia, External Negative 10/22/2018 Problem List  Date Reviewed: 2019 Codes Class Noted * (Principal) Post-term pregnancy, 40-42 weeks of gestation ICD-10-CM: O48.0 ICD-9-CM: 645.10  2019 Prenatal care of multigravida, antepartum ICD-10-CM: Z34.80 ICD-9-CM: V22.1  2018 Overview Signed 2018  8:30 PM by Stalin Johnson MD  
  1)prenatal labs wnl Current Facility-Administered Medications Medication Dose Route Frequency  ibuprofen (MOTRIN) tablet 800 mg  800 mg Oral Q6H PRN  
 HYDROcodone-acetaminophen (NORCO) 5-325 mg per tablet 1-2 Tab  1-2 Tab Oral Q4H PRN  
 naloxone (NARCAN) injection 0.4 mg  0.4 mg IntraVENous PRN  
 simethicone (MYLICON) tablet 80 mg  80 mg Oral QID PRN  
 docusate sodium (COLACE) capsule 100 mg  100 mg Oral BID  diphenhydrAMINE (BENADRYL) capsule 25-50 mg  25-50 mg Oral Q4H PRN  
 diph,Pertuss(AC),Tet Vac-PF (BOOSTRIX) suspension 0.5 mL  0.5 mL IntraMUSCular PRIOR TO DISCHARGE  witch hazel-glycerin (TUCKS) 12.5-50 % pads 1 Pad  1 Each PeriANAL PRN  
 zolpidem (AMBIEN) tablet 5 mg  5 mg Oral QHS PRN Assessment and Plan: PPD 1:  Patient appears to be having uncomplicated post-partum course. Continue routine perineal care and maternal education. Wants early discharge home today. Declines pain medicine, plans to take otc Motrin.  (2nd daughter) G1 daughter, 10 yo, G2, son 5yo Rh positive, rubella + Pt is an experienced breastfeeder. SIDs  precautions reviewed D/w pt:  Infx/driving/physical activity/pelvic rest precautions Pelvic rest x 6 wk ( no sex, swimming, tampons or douching) Pt may drive after 2-4 weeks as long as she is NOT taking narcotics & can quickly maneuver her foot from the gas to the brake. For the next 2-4 weeks:  eat, shower and nurse the baby. Advance activity as tolerated. Notify [facility] for temp > 100.5, vaginal discharge with odor, heavy VB, worsening pelvic/abdominal pain and/or other concerns.

## 2019-05-07 NOTE — LACTATION NOTE
In to see mom and infant for the first time. Experienced mom stated that infant is latching and nursing well. She also stated that she feels confident with no concerns. Reviewed discharge information. Mom and infant discharging to home this afternoon.

## 2019-05-07 NOTE — PROGRESS NOTES
Chart reviewed - no needs identified.  made introduction to family and provided informational packet on  mood disorder education/resources. Patient denies any history of postpartum depression/anxiety. Family receptive to receiving information and denied any additional needs from . Family has 's contact information should any needs/questions arise. Sunny Hines [address] 34

## 2019-06-18 PROBLEM — Z34.80 PRENATAL CARE OF MULTIGRAVIDA, ANTEPARTUM: Status: RESOLVED | Noted: 2018-09-29 | Resolved: 2019-06-18

## 2019-06-18 PROBLEM — O48.0 POST-TERM PREGNANCY, 40-42 WEEKS OF GESTATION: Status: RESOLVED | Noted: 2019-05-06 | Resolved: 2019-06-18

## 2020-09-09 ENCOUNTER — APPOINTMENT (RX ONLY)
Dept: URBAN - METROPOLITAN AREA CLINIC 349 | Facility: CLINIC | Age: 35
Setting detail: DERMATOLOGY
End: 2020-09-09
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
## 2020-09-09 DIAGNOSIS — D22 MELANOCYTIC NEVI: ICD-10-CM

## 2020-09-09 DIAGNOSIS — Z71.89 OTHER SPECIFIED COUNSELING: ICD-10-CM

## 2020-09-09 DIAGNOSIS — L57.8 OTHER SKIN CHANGES DUE TO CHRONIC EXPOSURE TO NONIONIZING RADIATION: ICD-10-CM
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
## 2020-09-09 PROBLEM — D22.5 MELANOCYTIC NEVI OF TRUNK: Status: ACTIVE | Noted: 2020-09-09

## 2020-09-09 PROCEDURE — ? BODY PHOTOGRAPHY

## 2020-09-09 PROCEDURE — ? OTHER

## 2020-09-09 PROCEDURE — ? COUNSELING

## 2020-09-09 PROCEDURE — 99203 OFFICE O/P NEW LOW 30 MIN: CPT

## 2020-09-09 PROCEDURE — ? EDUCATIONAL RESOURCES PROVIDED

## 2020-09-09 ASSESSMENT — LOCATION SIMPLE DESCRIPTION DERM
LOCATION SIMPLE: LEFT UPPER BACK
LOCATION SIMPLE: RIGHT CHEEK
LOCATION SIMPLE: LEFT CHEEK
LOCATION SIMPLE: RIGHT UPPER BACK
LOCATION SIMPLE: UPPER BACK

## 2020-09-09 ASSESSMENT — LOCATION DETAILED DESCRIPTION DERM
LOCATION DETAILED: LEFT INFERIOR MEDIAL UPPER BACK
LOCATION DETAILED: RIGHT INFERIOR CENTRAL MALAR CHEEK
LOCATION DETAILED: RIGHT INFERIOR UPPER BACK
LOCATION DETAILED: LEFT MEDIAL UPPER BACK
LOCATION DETAILED: INFERIOR THORACIC SPINE
LOCATION DETAILED: LEFT INFERIOR CENTRAL MALAR CHEEK

## 2020-09-09 ASSESSMENT — LOCATION ZONE DERM
LOCATION ZONE: TRUNK
LOCATION ZONE: FACE

## 2020-09-09 NOTE — PROCEDURE: OTHER
Detail Level: Detailed
Other (Free Text): Patient is going to begin retinol. Advised patient this could be drying. Discussed prescription strength tretinoin if she tolerates retinol ok
Note Text (......Xxx Chief Complaint.): This diagnosis correlates with the

## 2020-09-09 NOTE — PROCEDURE: BODY PHOTOGRAPHY
Detail Level: Generalized
Reason For Photography: The patient is obtaining body photography to observe existing suspicious moles and or monitor for the appearance of any new lesions.
Number Of Photographs (Optional- Will Not Render If 0): 2
Consent: Written consent obtained, risks reviewed for whole body photography. Patient understands that photograph costs may not be covered by insurance, and patient is ultimately responsible for payment.
Was The Entire Body Photographed (Cannot Bill Unless Entire Body Photographed)?: No
Whole Body Statement: The whole body was photographed today.